# Patient Record
Sex: MALE | Race: WHITE | NOT HISPANIC OR LATINO | Employment: FULL TIME | URBAN - METROPOLITAN AREA
[De-identification: names, ages, dates, MRNs, and addresses within clinical notes are randomized per-mention and may not be internally consistent; named-entity substitution may affect disease eponyms.]

---

## 2017-03-03 DIAGNOSIS — E78.5 HYPERLIPIDEMIA WITH TARGET LDL LESS THAN 130: ICD-10-CM

## 2017-03-03 RX ORDER — ATORVASTATIN CALCIUM 40 MG/1
40 TABLET, FILM COATED ORAL DAILY
Qty: 90 TABLET | Refills: 0 | Status: SHIPPED | OUTPATIENT
Start: 2017-03-03 | End: 2017-05-09

## 2017-03-03 NOTE — TELEPHONE ENCOUNTER
"Patient last refill 11/17/2015. Not sure if you wanted to start patient again at 40mg.    Note from 10/10/2016.    \"Hyperlipidemia with target LDL less than 130  Has intermittent problems with statin. He is willing to try again. Discussed vitamin D can help with aches if low. discussed co Q 10. He has found he does ok for awhile with a statin and then switches. Could also start at low dose and build up.  Did review the CV risk calculation with him.  He may try the atorvastatin again\"    Please send prescription to pharmacy if approved.    Loan Hung RN    "

## 2017-03-03 NOTE — TELEPHONE ENCOUNTER
Sent a 90 day supply. May be a good idea to do fasting lipid profile.  And check ALT (liver).

## 2017-03-03 NOTE — TELEPHONE ENCOUNTER
Patient states had restarted Lipitor after last OV. Has been taking the 40 mg and doing fine. Prescription t'd up to send to pharmacy.    Loan Hung RN

## 2017-03-03 NOTE — TELEPHONE ENCOUNTER
Please contact him and see if he has been taking any...     If not, I suggest we start lower dose.   Otherwise, see what he is taking and how he is doing...      Thanks!

## 2017-03-03 NOTE — TELEPHONE ENCOUNTER
atorvastatin (LIPITOR) 40 MG     Last Written Prescription Date: 11/17/15  Last Fill Quantity: 90, # refills: 0  Last Office Visit with G, P or [facility] prescribing provider: 10/10/16       Lab Results   Component Value Date    CHOL 237 10/04/2016     Lab Results   Component Value Date    HDL 40 10/04/2016     Lab Results   Component Value Date     10/04/2016     Lab Results   Component Value Date    TRIG 207 10/04/2016     Lab Results   Component Value Date    CHOLHDLRATIO 3.8 09/04/2014

## 2017-05-09 DIAGNOSIS — E78.5 HYPERLIPIDEMIA WITH TARGET LDL LESS THAN 130: ICD-10-CM

## 2017-05-09 NOTE — TELEPHONE ENCOUNTER
atorvastatin (LIPITOR) 40 MG     Last Written Prescription Date: 3/3/17  Last Fill Quantity: 90, # refills: 0  Last Office Visit with G, P or [facility] prescribing provider: 10/10/16       Lab Results   Component Value Date    CHOL 237 10/04/2016     Lab Results   Component Value Date    HDL 40 10/04/2016     Lab Results   Component Value Date     10/04/2016     Lab Results   Component Value Date    TRIG 207 10/04/2016     Lab Results   Component Value Date    CHOLHDLRATIO 3.8 09/04/2014

## 2017-05-11 RX ORDER — ATORVASTATIN CALCIUM 40 MG/1
TABLET, FILM COATED ORAL
Qty: 90 TABLET | Refills: 0 | Status: SHIPPED | OUTPATIENT
Start: 2017-05-11 | End: 2017-05-25

## 2017-05-11 NOTE — TELEPHONE ENCOUNTER
Pt. Informed that he needs lab only appt. Lab only scheduled for next Thursday.     Medication is being filled for 1 time refill only due to:  Future labs ordered FLP and ALT..       #90 sent d/t insurance requirements.    Prescription approved per [facility].    Flori Helton RN, BSN, PHN

## 2017-05-25 DIAGNOSIS — E78.5 HYPERLIPIDEMIA WITH TARGET LDL LESS THAN 130: ICD-10-CM

## 2017-05-25 LAB
ALT SERPL W P-5'-P-CCNC: 25 U/L (ref 0–70)
CHOLEST SERPL-MCNC: 139 MG/DL
HDLC SERPL-MCNC: 40 MG/DL
LDLC SERPL CALC-MCNC: 81 MG/DL
NONHDLC SERPL-MCNC: 99 MG/DL
TRIGL SERPL-MCNC: 91 MG/DL

## 2017-05-25 PROCEDURE — 80061 LIPID PANEL: CPT | Performed by: FAMILY MEDICINE

## 2017-05-25 PROCEDURE — 36415 COLL VENOUS BLD VENIPUNCTURE: CPT | Performed by: FAMILY MEDICINE

## 2017-05-25 PROCEDURE — 84460 ALANINE AMINO (ALT) (SGPT): CPT | Performed by: FAMILY MEDICINE

## 2017-05-25 RX ORDER — ATORVASTATIN CALCIUM 40 MG/1
40 TABLET, FILM COATED ORAL DAILY
Qty: 90 TABLET | Refills: 1 | Status: SHIPPED | OUTPATIENT
Start: 2017-05-25 | End: 2018-03-14

## 2017-10-27 ENCOUNTER — TRANSFERRED RECORDS (OUTPATIENT)
Dept: HEALTH INFORMATION MANAGEMENT | Facility: CLINIC | Age: 52
End: 2017-10-27

## 2017-11-08 ENCOUNTER — HOSPITAL ENCOUNTER (OUTPATIENT)
Facility: CLINIC | Age: 52
Discharge: HOME | End: 2017-11-08
Attending: INTERNAL MEDICINE | Admitting: INTERNAL MEDICINE
Payer: COMMERCIAL

## 2017-11-08 VITALS
SYSTOLIC BLOOD PRESSURE: 125 MMHG | BODY MASS INDEX: 33.18 KG/M2 | DIASTOLIC BLOOD PRESSURE: 74 MMHG | HEIGHT: 72 IN | RESPIRATION RATE: 23 BRPM | OXYGEN SATURATION: 95 % | WEIGHT: 245 LBS

## 2017-11-08 LAB — COLONOSCOPY: NORMAL

## 2017-11-08 PROCEDURE — G0121 COLON CA SCRN NOT HI RSK IND: HCPCS | Performed by: INTERNAL MEDICINE

## 2017-11-08 PROCEDURE — G0500 MOD SEDAT ENDO SERVICE >5YRS: HCPCS | Performed by: INTERNAL MEDICINE

## 2017-11-08 PROCEDURE — 25000128 H RX IP 250 OP 636: Performed by: INTERNAL MEDICINE

## 2017-11-08 PROCEDURE — 45378 DIAGNOSTIC COLONOSCOPY: CPT | Performed by: INTERNAL MEDICINE

## 2017-11-08 RX ORDER — LIDOCAINE 40 MG/G
CREAM TOPICAL
Status: DISCONTINUED | OUTPATIENT
Start: 2017-11-08 | End: 2017-11-08 | Stop reason: HOSPADM

## 2017-11-08 RX ORDER — NALOXONE HYDROCHLORIDE 0.4 MG/ML
.1-.4 INJECTION, SOLUTION INTRAMUSCULAR; INTRAVENOUS; SUBCUTANEOUS
Status: DISCONTINUED | OUTPATIENT
Start: 2017-11-08 | End: 2017-11-08 | Stop reason: HOSPADM

## 2017-11-08 RX ORDER — ONDANSETRON 2 MG/ML
4 INJECTION INTRAMUSCULAR; INTRAVENOUS EVERY 6 HOURS PRN
Status: DISCONTINUED | OUTPATIENT
Start: 2017-11-08 | End: 2017-11-08 | Stop reason: HOSPADM

## 2017-11-08 RX ORDER — FENTANYL CITRATE 50 UG/ML
INJECTION, SOLUTION INTRAMUSCULAR; INTRAVENOUS PRN
Status: DISCONTINUED | OUTPATIENT
Start: 2017-11-08 | End: 2017-11-08 | Stop reason: HOSPADM

## 2017-11-08 RX ORDER — ONDANSETRON 4 MG/1
4 TABLET, ORALLY DISINTEGRATING ORAL EVERY 6 HOURS PRN
Status: DISCONTINUED | OUTPATIENT
Start: 2017-11-08 | End: 2017-11-08 | Stop reason: HOSPADM

## 2017-11-08 RX ORDER — FLUMAZENIL 0.1 MG/ML
0.2 INJECTION, SOLUTION INTRAVENOUS
Status: DISCONTINUED | OUTPATIENT
Start: 2017-11-08 | End: 2017-11-08 | Stop reason: HOSPADM

## 2017-11-08 RX ORDER — ONDANSETRON 2 MG/ML
4 INJECTION INTRAMUSCULAR; INTRAVENOUS
Status: DISCONTINUED | OUTPATIENT
Start: 2017-11-08 | End: 2017-11-08 | Stop reason: HOSPADM

## 2017-11-08 NOTE — LETTER
October 25, 2017      Nash Hauser  [id number]  [place] [address]        Dear Nash,       Thank you for choosing [facility]. You are scheduled for the following service.   Date:  November 8th, 2017 - Wednesday       Procedure: COLONOSCOPY  Doctor:   Oral Goodman         Arrival Time:  12:00 pm   *check in at Emergency/Endoscopy desk*  Procedure Time:  12:30 pm    Location:   [facility]        Endoscopy Department, First Floor (Enter through ER Doors) *         [address] [place], [place] [address]      [phone] or [phone] () to reschedule        Colonoscopy is the most accurate test to detect colon polyps and colon cancer; and the only test where polyps can be removed. During this procedure, a doctor examines the lining of your large intestine and rectum through a flexible tube.     Transportation  Arrange for a ride for the day of your procedures with a responsible adult.  A taxi ride is not an option unless you are accompanied by a responsible adult. If you fail to arrange transportation with a responsible adult, your procedure will be cancelled and rescheduled.        NuLYTELY  PREP    Fill your enclosed prescription for NuLYTELY  at your local pharmacy. Please call our office at [phone] if you did not receive a prescription.      PREPARATION FOR COLONOSCOPY    7 days before:    Discontinue fiber supplements and medications containing iron. This includes Metamucil  and Fibercon ; and multivitamins with iron.  3 days before:    Begin a low-fiber diet. A low-fiber diet helps making the cleanout more effective. For additional details on low-fiber diet, please refer to the table on the last page.  2 days before:    Continue the low-fiber diet.     Drink at least 8 glasses of water throughout the day.     Stop eating solid foods at 11:45 pm.  1 day before:    In the morning: begin a clear liquid diet (liquids you can see  through).     Examples of a clear liquid diet include: water, clear broth or bouillon, Gatorade, Pedialyte or Powerade, carbonated and non-carbonated soft drinks (Sprite , 7-Up , ginger ale), strained fruit juices without pulp (apple, white grape, white cranberry), Jell-O  and popsicles.     The following are not allowed on a clear liquid diet: red liquids, alcoholic beverages, coffee, dairy products (milk, creamer, and yogurt), protein shakes, creamy broths, juice with pulp and chewing tobacco.    At 4pm: drink 1 (one) 8 oz glass of NuLYTELY  solution every 15 minutes until half the bottle (approximately 8 glasses of 8 oz) is gone. Keep the solution refrigerated. Do not drink any other liquids while you are drinking the NuLYTELY  solution.      Over the course of the evening, drink an additional   liter of clear liquids and continue clear liquid diet.    COLON CLEANSING TIPS: drink adequate amounts of fluids before and after your colon cleansing to prevent dehydration. Stay near a toilet because you will have diarrhea. Even if you are sitting on the toilet, continue to drink the cleansing solution every 15 minutes. If you feel nauseous or vomit, rinse your mouth with water, take a 15 to 30-minute-break and then continue drinking the solution. You will be uncomfortable until the stool has flushed from your colon (in about 2 to 4 hours). You may feel chilled.    DAY OF YOUR PROCEDURE  You may take all of your morning medications including blood pressure medications, blood thinners (if you have not been instructed to stop these by our office), methadone, and anti-seizure medications with sips of water 3 hours prior to your procedure or earlier. Do not take insulin or vitamins prior to your procedure. Continue the clear liquid diet.    6 hours prior: drink 1 (one) 8 oz glass of NuLYTELY  solution every 15 minutes until the remaining solution (approximately 8 glasses of 8 oz) is gone. Keep the solution  refrigerated.      4 hours prior:   o STOP consuming all liquids   o Do not take anything by mouth during this time.   o Allow extra time to travel to your procedure as you may need to stop and use a restroom along the way.  You are ready for the procedure, if you followed all instructions and your stool is no longer formed, but clear or yellow liquid. If you are unsure whether your colon is clean, please call our office at [phone] before you leave for your appointment.      Bring the following to your procedure:  - Insurance Card/Photo ID.   - List of current medications including over-the-counter medications and supplements.   - Your rescue inhaler if you currently use one to control asthma.    Canceling or rescheduling your appointment:   If you must cancel or reschedule your appointment, please call [phone] as soon as possible.    COLONOSCOPY PRE-PROCEDURE CHECKLIST  If you have diabetes, ask your regular doctor for diet and medication restrictions.  If you take an anticoagulant or anti-platelet medication (such as Coumadin , Lovenox , Pradaxa , Xarelto , Eliquis , etc.), please call your primary doctor for advice on holding this medication.  If you take aspirin you may continue to do so.  If you are or may be pregnant, please discuss the risks and benefits of this procedure with your doctor.    What happens during a colonoscopy?    Plan to spend up to two hours, starting at registration time, at the endoscopy center the day of your procedure. The colonoscopy takes an average of 15 to 30 minutes. Recovery time is about 30 minutes.    Before the exam:    You will change into a gown.    Your medical history and medication list will be reviewed with you, unless that has been done over the phone prior to the procedure.     A nurse will insert an intravenous (IV) line into your hand or arm.    The doctor will meet with you and will give you a consent form to sign.    During the exam:     Medicine will be  given through the IV line to help you relax.     Your heart rate and oxygen levels will be monitored. If your blood pressure is low, you may be given fluids through the IV line.     The doctor will insert a flexible hollow tube, called a colonoscope, into your rectum. The scope will be advanced slowly through the large intestine (colon).    You may have a feeling of fullness or pressure.     If an abnormal tissue or a polyp is found, the doctor may remove it through the endoscope for closer examination, or biopsy. Tissue removal is painless.          After the exam:           Any tissue samples removed during the exam will be sent to a lab for evaluation. It may take 5-7 working days for you to be notified of the results.     A nurse will provide you with complete discharge instructions before you leave the endoscopy center. Be sure to ask the nurse for specific instructions if you take blood thinners such as Aspirin, Coumadin or Plavix.     The doctor will prepare a full report for you and for the physician who referred you for the procedure.     Your doctor will talk with you about the initial results of your exam.      Medication given during the exam will prohibit you from driving for the rest of the day.     Following the exam, you may resume your normal diet. Your first meal should be light, no greasy foods. Avoid alcohol until the next day.     You may resume your regular activities the day after the procedure.     LOW-FIBER DIET  Foods RECOMMENDED Foods to AVOID   Breads, Cereal, Rice and Pasta:   White bread, rolls, biscuits, croissant and celso toast.   Waffles, Lao toast and pancakes.   White rice, noodles, pasta, macaroni and peeled cooked potatoes.   Plain crackers and saltines.   Cooked cereals: farina, cream of rice.   Cold cereals: Puffed Rice , Rice Krispies , Corn Flakes  and Special K    Breads, Cereal, Rice and Pasta:   Breads or rolls with nuts, seeds or fruit.   Whole wheat, pumpernickel,  rye breads and cornbread.   Potatoes with skin, brown or wild rice, and kasha (buckwheat).     Vegetables:   Tender cooked and canned vegetables without seeds: carrots, asparagus tips, green or wax beans, pumpkin, spinach, lima beans. Vegetables:   Raw or steamed vegetables.   Vegetables with seeds.   Sauerkraut.   Winter squash, peas, broccoli, Brussel sprouts, cabbage, onions, cauliflower, baked beans, peas and corn.   Fruits:   Strained fruit juice.   Canned fruit, except pineapple.   Ripe bananas and melon. Fruits:   Prunes and prune juice.   Raw fruits.   Dried fruits: figs, dates and raisins.   Milk/Dairy:   Milk: plain or flavored.   Yogurt, custard and ice cream.   Cheese and cottage cheese Milk/Dairy:     Meat and other proteins:   ground, well-cooked tender beef, lamb, ham, veal, pork, fish, poultry and organ meats.   Eggs.   Peanut butter without nuts. Meat and other proteins:   Tough, fibrous meats with gristle.   Dry beans, peas and lentils.   Peanut butter with nuts.   Tofu.   Fats, Snack, Sweets, Condiments and Beverages:   Margarine, butter, oils, mayonnaise, sour cream and salad dressing, plain gravy.   Sugar, hard candy, clear jelly, honey and syrup.   Spices, cooked herbs, bouillon, broth and soups made with allowed vegetable, ketchup and mustard.   Coffee, tea and carbonated drinks.   Plain cakes, cookies and pretzels.   Gelatin, plain puddings, custard, ice cream, sherbet and popsicles. Fats, Snack, Sweets, Condiments and Beverages:   Nuts, seeds and coconut.   Jam, marmalade and preserves.   Pickles, olives, relish and horseradish.   All desserts containing nuts, seeds, dried fruit and coconut; or made from whole grains or bran.   Candy made with nuts or seeds.   Popcorn.   DIRECTIONS TO THE ENDOSCOPY DEPARTMENT    From the [place] ([facility])  Take [address] South, exit on [address] . Get into the left hand ho, turn left (east), go one-half mile to [address] and  turn left. Go north to the first stoplight, take a right on [address] and follow it to the Emergency entrance.    From the south ([facility])  Take [address]N to the [address] split and exit on [address] . On [address] , turn left (west) to [address]. Turn right (north) on [address]. Go north to the first stoplight, take a right on [address] and follow it to the Emergency entrance.    From the [place] via [address] ([facility])  Take [address] south to [place] [address] exit. Turn right on [address] . Go west to [address]. Turn right (north) on [address]. Go to the first stoplight, take a right and follow on [address] to the Emergency entrance.    From the east via [address] ([facility])  Take [address] to [address]. Turn left (south) on [address] to [address]. Turn left (east) on [address] and follow it to the Emergency entrance.    From the west via [address] ([facility])  Take [address] east to [address]. Turn right (south) on [address] to [address]. Turn left (east) on [address] and follow it to the Emergency entrance.

## 2017-11-08 NOTE — LETTER
October 25, 2017      Nash Hauser  [id number]  [place] [address]        Dear Nash,       Thank you for choosing [facility]. You are scheduled for the following service.   Date:  November 8th, 2017 - Wednesday       Procedure: COLONOSCOPY  Doctor:   Oral Goodman         Arrival Time:  12:00 pm   *check in at Emergency/Endoscopy desk*  Procedure Time:  12:30 pm    Location:   [facility]        Endoscopy Department, First Floor (Enter through ER Doors) *         [address] [place], [place] [address]      [phone] or [phone] () to reschedule        Colonoscopy is the most accurate test to detect colon polyps and colon cancer; and the only test where polyps can be removed. During this procedure, a doctor examines the lining of your large intestine and rectum through a flexible tube.     Transportation  Arrange for a ride for the day of your procedures with a responsible adult.  A taxi ride is not an option unless you are accompanied by a responsible adult. If you fail to arrange transportation with a responsible adult, your procedure will be cancelled and rescheduled.        NuLYTELY  PREP    Fill your enclosed prescription for NuLYTELY  at your local pharmacy. Please call our office at [phone] if you did not receive a prescription.      PREPARATION FOR COLONOSCOPY    7 days before:    Discontinue fiber supplements and medications containing iron. This includes Metamucil  and Fibercon ; and multivitamins with iron.  3 days before:    Begin a low-fiber diet. A low-fiber diet helps making the cleanout more effective. For additional details on low-fiber diet, please refer to the table on the last page.  2 days before:    Continue the low-fiber diet.     Drink at least 8 glasses of water throughout the day.     Stop eating solid foods at 11:45 pm.  1 day before:    In the morning: begin a clear liquid diet (liquids you can see  through).     Examples of a clear liquid diet include: water, clear broth or bouillon, Gatorade, Pedialyte or Powerade, carbonated and non-carbonated soft drinks (Sprite , 7-Up , ginger ale), strained fruit juices without pulp (apple, white grape, white cranberry), Jell-O  and popsicles.     The following are not allowed on a clear liquid diet: red liquids, alcoholic beverages, coffee, dairy products (milk, creamer, and yogurt), protein shakes, creamy broths, juice with pulp and chewing tobacco.    At 4pm: drink 1 (one) 8 oz glass of NuLYTELY  solution every 15 minutes until half the bottle (approximately 8 glasses of 8 oz) is gone. Keep the solution refrigerated. Do not drink any other liquids while you are drinking the NuLYTELY  solution.      Over the course of the evening, drink an additional   liter of clear liquids and continue clear liquid diet.    COLON CLEANSING TIPS: drink adequate amounts of fluids before and after your colon cleansing to prevent dehydration. Stay near a toilet because you will have diarrhea. Even if you are sitting on the toilet, continue to drink the cleansing solution every 15 minutes. If you feel nauseous or vomit, rinse your mouth with water, take a 15 to 30-minute-break and then continue drinking the solution. You will be uncomfortable until the stool has flushed from your colon (in about 2 to 4 hours). You may feel chilled.    DAY OF YOUR PROCEDURE  You may take all of your morning medications including blood pressure medications, blood thinners (if you have not been instructed to stop these by our office), methadone, and anti-seizure medications with sips of water 3 hours prior to your procedure or earlier. Do not take insulin or vitamins prior to your procedure. Continue the clear liquid diet.    6 hours prior: drink 1 (one) 8 oz glass of NuLYTELY  solution every 15 minutes until the remaining solution (approximately 8 glasses of 8 oz) is gone. Keep the solution  refrigerated.      4 hours prior:   o STOP consuming all liquids   o Do not take anything by mouth during this time.   o Allow extra time to travel to your procedure as you may need to stop and use a restroom along the way.  You are ready for the procedure, if you followed all instructions and your stool is no longer formed, but clear or yellow liquid. If you are unsure whether your colon is clean, please call our office at [phone] before you leave for your appointment.      Bring the following to your procedure:  - Insurance Card/Photo ID.   - List of current medications including over-the-counter medications and supplements.   - Your rescue inhaler if you currently use one to control asthma.    Canceling or rescheduling your appointment:   If you must cancel or reschedule your appointment, please call [phone] as soon as possible.    COLONOSCOPY PRE-PROCEDURE CHECKLIST  If you have diabetes, ask your regular doctor for diet and medication restrictions.  If you take an anticoagulant or anti-platelet medication (such as Coumadin , Lovenox , Pradaxa , Xarelto , Eliquis , etc.), please call your primary doctor for advice on holding this medication.  If you take aspirin you may continue to do so.  If you are or may be pregnant, please discuss the risks and benefits of this procedure with your doctor.    What happens during a colonoscopy?    Plan to spend up to two hours, starting at registration time, at the endoscopy center the day of your procedure. The colonoscopy takes an average of 15 to 30 minutes. Recovery time is about 30 minutes.    Before the exam:    You will change into a gown.    Your medical history and medication list will be reviewed with you, unless that has been done over the phone prior to the procedure.     A nurse will insert an intravenous (IV) line into your hand or arm.    The doctor will meet with you and will give you a consent form to sign.    During the exam:     Medicine will be  given through the IV line to help you relax.     Your heart rate and oxygen levels will be monitored. If your blood pressure is low, you may be given fluids through the IV line.     The doctor will insert a flexible hollow tube, called a colonoscope, into your rectum. The scope will be advanced slowly through the large intestine (colon).    You may have a feeling of fullness or pressure.     If an abnormal tissue or a polyp is found, the doctor may remove it through the endoscope for closer examination, or biopsy. Tissue removal is painless.          After the exam:           Any tissue samples removed during the exam will be sent to a lab for evaluation. It may take 5-7 working days for you to be notified of the results.     A nurse will provide you with complete discharge instructions before you leave the endoscopy center. Be sure to ask the nurse for specific instructions if you take blood thinners such as Aspirin, Coumadin or Plavix.     The doctor will prepare a full report for you and for the physician who referred you for the procedure.     Your doctor will talk with you about the initial results of your exam.      Medication given during the exam will prohibit you from driving for the rest of the day.     Following the exam, you may resume your normal diet. Your first meal should be light, no greasy foods. Avoid alcohol until the next day.     You may resume your regular activities the day after the procedure.     LOW-FIBER DIET  Foods RECOMMENDED Foods to AVOID   Breads, Cereal, Rice and Pasta:   White bread, rolls, biscuits, croissant and celso toast.   Waffles, Bengali toast and pancakes.   White rice, noodles, pasta, macaroni and peeled cooked potatoes.   Plain crackers and saltines.   Cooked cereals: farina, cream of rice.   Cold cereals: Puffed Rice , Rice Krispies , Corn Flakes  and Special K    Breads, Cereal, Rice and Pasta:   Breads or rolls with nuts, seeds or fruit.   Whole wheat, pumpernickel,  rye breads and cornbread.   Potatoes with skin, brown or wild rice, and kasha (buckwheat).     Vegetables:   Tender cooked and canned vegetables without seeds: carrots, asparagus tips, green or wax beans, pumpkin, spinach, lima beans. Vegetables:   Raw or steamed vegetables.   Vegetables with seeds.   Sauerkraut.   Winter squash, peas, broccoli, Brussel sprouts, cabbage, onions, cauliflower, baked beans, peas and corn.   Fruits:   Strained fruit juice.   Canned fruit, except pineapple.   Ripe bananas and melon. Fruits:   Prunes and prune juice.   Raw fruits.   Dried fruits: figs, dates and raisins.   Milk/Dairy:   Milk: plain or flavored.   Yogurt, custard and ice cream.   Cheese and cottage cheese Milk/Dairy:     Meat and other proteins:   ground, well-cooked tender beef, lamb, ham, veal, pork, fish, poultry and organ meats.   Eggs.   Peanut butter without nuts. Meat and other proteins:   Tough, fibrous meats with gristle.   Dry beans, peas and lentils.   Peanut butter with nuts.   Tofu.   Fats, Snack, Sweets, Condiments and Beverages:   Margarine, butter, oils, mayonnaise, sour cream and salad dressing, plain gravy.   Sugar, hard candy, clear jelly, honey and syrup.   Spices, cooked herbs, bouillon, broth and soups made with allowed vegetable, ketchup and mustard.   Coffee, tea and carbonated drinks.   Plain cakes, cookies and pretzels.   Gelatin, plain puddings, custard, ice cream, sherbet and popsicles. Fats, Snack, Sweets, Condiments and Beverages:   Nuts, seeds and coconut.   Jam, marmalade and preserves.   Pickles, olives, relish and horseradish.   All desserts containing nuts, seeds, dried fruit and coconut; or made from whole grains or bran.   Candy made with nuts or seeds.   Popcorn.   DIRECTIONS TO THE ENDOSCOPY DEPARTMENT    From the [place] ([facility])  Take [address] South, exit on [address] . Get into the left hand ho, turn left (east), go one-half mile to [address] and  turn left. Go north to the first stoplight, take a right on [address] and follow it to the Emergency entrance.    From the south ([facility])  Take [address]N to the [address]E split and exit on [address] . On [address] , turn left (west) to [address]. Turn right (north) on [address]. Go north to the first stoplight, take a right on [address] and follow it to the Emergency entrance.    From the east via [address] ([facility])  Take [address] south to [place] [address] exit. Turn right on [address] . Go west to [address]. Turn right (north) on [address]. Go to the first stoplight, take a right and follow on [address] to the Emergency entrance.    From the east via [address] ([facility])  Take [address] to [address]. Turn left (south) on [address] to [address]. Turn left (east) on [address] and follow it to the Emergency entrance.    From the west via [address] ([facility])  Take [address] east to [address]. Turn right (south) on [address] to [address]. Turn left (east) on [address] and follow it to the Emergency entrance.

## 2017-11-08 NOTE — H&P
Pre-Endoscopy History and Physical     Nash Hauser MRN# [id number]   YOB: 1965 Age: 52 year old     Date of Procedure: 11/8/2017  Primary care provider: Nguyen Murray  Type of Endoscopy: Colonoscopy with possible biopsy, possible polypectomy  Reason for Procedure: screen  Type of Anesthesia Anticipated: Conscious Sedation    HPI:    Nash is a 52 year old male who will be undergoing the above procedure.      A history and physical has been performed. The patient's medications and allergies have been reviewed. The risks and benefits of the procedure and the sedation options and risks were discussed with the patient.  All questions were answered and informed consent was obtained.      He denies a personal or family history of anesthesia complications or bleeding disorders.     Patient Active Problem List   Diagnosis     Unilateral small kidney     Other genital herpes     Impaired fasting glucose     Hyperlipidemia with target LDL less than 130     Hypertension goal BP (blood pressure) < 140/90     Elevated BMI (H)        Past Medical History:   Diagnosis Date     Hypertension      Other and unspecified hyperlipidemia      Other genital herpes     Uses oral Valtrex and topical acyclovir when gets flareup     Renal disease     Only one functioning kidney     Unilateral small kidney     Atrophic rt kidney        Past Surgical History:   Procedure Laterality Date     BACK SURGERY       C NONSPECIFIC PROCEDURE  2002    R Elbow surgery; incl resection of portion of radial head     C NONSPECIFIC PROCEDURE  2002    Lumbar microdiskectomy     C NONSPECIFIC PROCEDURE  1992    tonsillectomy     C NONSPECIFIC PROCEDURE  2004    Another diskectomy     elbow and back surgery       EXCISE EXOSTOSIS TOE(S)  9/23/2013    Procedure: EXCISE EXOSTOSIS TOE(S);  Left Third Toe Bone Spur removal ;  Surgeon: Stephani Ramirez DPM, Podiatr;  Location:  OR     SURGICAL HISTORY OF -       LASIK right eye; did not work as has  some kind of basement membrane disorder.       Social History   Substance Use Topics     Smoking status: Never Smoker     Smokeless tobacco: Never Used     Alcohol use 2.0 - 3.0 oz/week     4 - 6 Standard drinks or equivalent per week      Comment: 7-8 beers per week       Family History   Problem Relation Age of Onset     C.A.D. Father      Had coronary artery bypass; late 60's.     CANCER Father      skin     Eye Disorder Mother      cataracts; glaucoma, mac degeneration     Respiratory Mother      COPD     Colon Cancer No family hx of        Prior to Admission medications    Medication Sig Start Date End Date Taking? Authorizing Provider   atorvastatin (LIPITOR) 40 MG tablet Take 1 tablet (40 mg) by mouth daily 5/25/17  Yes Nguyen Murray MD   losartan-hydrochlorothiazide (HYZAAR) 100-25 MG per tablet Take 1 tablet by mouth daily   Yes Reported, Patient   Cetirizine HCl (ZYRTEC ALLERGY PO) Take  by mouth.   Yes Reported, Patient   order for DME Equipment being ordered: tall aircast boot 9/28/16   Stephani Ramirez DPM, Podiatry/Foot and Ankle Surgery   hydrocortisone (ANUSOL-HC) 2.5 % rectal cream Place rectally 3 times daily as needed for hemorrhoids 11/24/14   Nguyen Murray MD       Allergies   Allergen Reactions     No Known Drug Allergies         REVIEW OF SYSTEMS:   5 point ROS negative except as noted above in HPI, including Gen., Resp., CV, GI &  system review.    PHYSICAL EXAM:   /79  Resp 11  Ht 1.829 m (6')  Wt 111.1 kg (245 lb)  SpO2 97%  BMI 33.23 kg/m2 Estimated body mass index is 33.23 kg/(m^2) as calculated from the following:    Height as of this encounter: 1.829 m (6').    Weight as of this encounter: 111.1 kg (245 lb).   GENERAL APPEARANCE: alert, and oriented  MENTAL STATUS: alert  AIRWAY EXAM: Mallampatti Class I (visualization of the soft palate, fauces, uvula, anterior and posterior pillars)  RESP: lungs clear to auscultation - no rales, rhonchi or wheezes  CV: regular rates and  rhythm  DIAGNOSTICS:    Not indicated    IMPRESSION   ASA Class 2 - Mild systemic disease    PLAN:   Plan for Colonoscopy with possible biopsy, possible polypectomy. We discussed the risks, benefits and alternatives and the patient wished to proceed.    The above has been forwarded to the consulting provider.      Signed Electronically by: Oral Goodman  November 8, 2017

## 2018-03-14 DIAGNOSIS — E78.5 HYPERLIPIDEMIA WITH TARGET LDL LESS THAN 130: Primary | ICD-10-CM

## 2018-03-14 DIAGNOSIS — I10 HYPERTENSION GOAL BP (BLOOD PRESSURE) < 140/90: ICD-10-CM

## 2018-03-14 DIAGNOSIS — R73.01 IMPAIRED FASTING GLUCOSE: ICD-10-CM

## 2018-03-15 RX ORDER — ATORVASTATIN CALCIUM 40 MG/1
TABLET, FILM COATED ORAL
Qty: 90 TABLET | Refills: 0 | Status: SHIPPED | OUTPATIENT
Start: 2018-03-15 | End: 2018-07-29

## 2018-03-15 NOTE — TELEPHONE ENCOUNTER
He is due for visit.  Almost due for fasting lab.    Please call and help him schedule.  Did give 90 day refill at this time.        Recent Labs   Lab Test  05/25/17   0825  10/04/16   0916  09/04/14   0906  03/03/14   0850   CHOL  139  237*  169  246*   HDL  40  40  45  47   LDL  81  156*  88  157*   TRIG  91  207*  179*  210*   CHOLHDLRATIO   --    --   3.8  5.2*     ]

## 2018-03-15 NOTE — TELEPHONE ENCOUNTER
"Requested Prescriptions   Pending Prescriptions Disp Refills     atorvastatin (LIPITOR) 40 MG tablet [Pharmacy Med Name: ATORVASTATIN 40 MG TABLET]    Last Written Prescription Date:  5/25/2017  Last Fill Quantity: 90,  # refills: 1   Last office visit: 10/10/2016 with prescribing provider:  Nguyen Murray     Future Office Visit:     90 tablet 0     Sig: TAKE 1 TABLET BY MOUTH DAILY    Statins Protocol Failed    3/14/2018  7:24 PM       Failed - Recent (12 mo) or future (30 days) visit within the authorizing provider's specialty    Patient had office visit in the last 12 months or has a visit in the next 30 days with authorizing provider or within the authorizing provider's specialty.  See \"Patient Info\" tab in inbasket, or \"Choose Columns\" in Meds & Orders section of the refill encounter.           Passed - LDL on file in past 12 months    Recent Labs   Lab Test  05/25/17   0825   LDL  81            Passed - No abnormal creatine kinase in past 12 months    No lab results found.         Passed - Patient is age 18 or older          "

## 2018-03-15 NOTE — TELEPHONE ENCOUNTER
Routing refill request to provider for review/approval because:  Loli given x1 and patient did not follow up, please advise  Patient needs to be seen because it has been more than 1 year since last office visit.

## 2018-04-05 DIAGNOSIS — E78.5 HYPERLIPIDEMIA WITH TARGET LDL LESS THAN 130: ICD-10-CM

## 2018-04-05 DIAGNOSIS — I10 HYPERTENSION GOAL BP (BLOOD PRESSURE) < 140/90: ICD-10-CM

## 2018-04-05 DIAGNOSIS — R73.01 IMPAIRED FASTING GLUCOSE: ICD-10-CM

## 2018-04-05 LAB
ALBUMIN SERPL-MCNC: 4.2 G/DL (ref 3.4–5)
ALP SERPL-CCNC: 83 U/L (ref 40–150)
ALT SERPL W P-5'-P-CCNC: 39 U/L (ref 0–70)
ANION GAP SERPL CALCULATED.3IONS-SCNC: 7 MMOL/L (ref 3–14)
AST SERPL W P-5'-P-CCNC: 28 U/L (ref 0–45)
BILIRUB SERPL-MCNC: 0.6 MG/DL (ref 0.2–1.3)
BUN SERPL-MCNC: 23 MG/DL (ref 7–30)
CALCIUM SERPL-MCNC: 9.2 MG/DL (ref 8.5–10.1)
CHLORIDE SERPL-SCNC: 102 MMOL/L (ref 94–109)
CHOLEST SERPL-MCNC: 158 MG/DL
CO2 SERPL-SCNC: 27 MMOL/L (ref 20–32)
CREAT SERPL-MCNC: 1.36 MG/DL (ref 0.66–1.25)
CREAT UR-MCNC: 54 MG/DL
GFR SERPL CREATININE-BSD FRML MDRD: 55 ML/MIN/1.7M2
GLUCOSE SERPL-MCNC: 109 MG/DL (ref 70–99)
HBA1C MFR BLD: 5.6 % (ref 0–6.4)
HDLC SERPL-MCNC: 40 MG/DL
LDLC SERPL CALC-MCNC: 97 MG/DL
MICROALBUMIN UR-MCNC: 5 MG/L
MICROALBUMIN/CREAT UR: 9.39 MG/G CR (ref 0–17)
NONHDLC SERPL-MCNC: 118 MG/DL
POTASSIUM SERPL-SCNC: 4.2 MMOL/L (ref 3.4–5.3)
PROT SERPL-MCNC: 7.7 G/DL (ref 6.8–8.8)
SODIUM SERPL-SCNC: 136 MMOL/L (ref 133–144)
TRIGL SERPL-MCNC: 106 MG/DL

## 2018-04-05 PROCEDURE — 36415 COLL VENOUS BLD VENIPUNCTURE: CPT | Performed by: FAMILY MEDICINE

## 2018-04-05 PROCEDURE — 83036 HEMOGLOBIN GLYCOSYLATED A1C: CPT | Performed by: FAMILY MEDICINE

## 2018-04-05 PROCEDURE — 80061 LIPID PANEL: CPT | Performed by: FAMILY MEDICINE

## 2018-04-05 PROCEDURE — 82043 UR ALBUMIN QUANTITATIVE: CPT | Performed by: FAMILY MEDICINE

## 2018-04-05 PROCEDURE — 80053 COMPREHEN METABOLIC PANEL: CPT | Performed by: FAMILY MEDICINE

## 2018-04-23 ENCOUNTER — OFFICE VISIT (OUTPATIENT)
Dept: FAMILY MEDICINE | Facility: CLINIC | Age: 53
End: 2018-04-23
Payer: COMMERCIAL

## 2018-04-23 VITALS
RESPIRATION RATE: 16 BRPM | SYSTOLIC BLOOD PRESSURE: 126 MMHG | WEIGHT: 256.3 LBS | DIASTOLIC BLOOD PRESSURE: 86 MMHG | BODY MASS INDEX: 34.72 KG/M2 | HEART RATE: 65 BPM | TEMPERATURE: 98.3 F | OXYGEN SATURATION: 99 % | HEIGHT: 72 IN

## 2018-04-23 DIAGNOSIS — I10 HYPERTENSION GOAL BP (BLOOD PRESSURE) < 140/90: ICD-10-CM

## 2018-04-23 DIAGNOSIS — E66.01 MORBID OBESITY DUE TO EXCESS CALORIES (H): ICD-10-CM

## 2018-04-23 DIAGNOSIS — Z23 NEED FOR TDAP VACCINATION: ICD-10-CM

## 2018-04-23 DIAGNOSIS — R73.01 IMPAIRED FASTING GLUCOSE: ICD-10-CM

## 2018-04-23 DIAGNOSIS — N27.0 UNILATERAL SMALL KIDNEY: ICD-10-CM

## 2018-04-23 DIAGNOSIS — Z12.5 SCREENING FOR PROSTATE CANCER: ICD-10-CM

## 2018-04-23 DIAGNOSIS — Z00.00 ROUTINE GENERAL MEDICAL EXAMINATION AT A HEALTH CARE FACILITY: Primary | ICD-10-CM

## 2018-04-23 PROCEDURE — 90715 TDAP VACCINE 7 YRS/> IM: CPT | Performed by: FAMILY MEDICINE

## 2018-04-23 PROCEDURE — 90471 IMMUNIZATION ADMIN: CPT | Performed by: FAMILY MEDICINE

## 2018-04-23 PROCEDURE — 99396 PREV VISIT EST AGE 40-64: CPT | Mod: 25 | Performed by: FAMILY MEDICINE

## 2018-04-23 PROCEDURE — G0103 PSA SCREENING: HCPCS | Performed by: FAMILY MEDICINE

## 2018-04-23 ASSESSMENT — ENCOUNTER SYMPTOMS
ABDOMINAL PAIN: 0
NERVOUS/ANXIOUS: 0
DIARRHEA: 0
FREQUENCY: 0
PARESTHESIAS: 0
CONSTIPATION: 0
SHORTNESS OF BREATH: 0
PALPITATIONS: 0
JOINT SWELLING: 0
ARTHRALGIAS: 0
EYE PAIN: 0
HEARTBURN: 0
MYALGIAS: 0
WEAKNESS: 0
DYSURIA: 0
HEMATURIA: 0
COUGH: 0
NAUSEA: 0
DIZZINESS: 0
HEADACHES: 0
CHILLS: 0
HEMATOCHEZIA: 0
FEVER: 0
SORE THROAT: 0

## 2018-04-23 NOTE — PROGRESS NOTES
SUBJECTIVE:   CC: Nash Hauser is an 53 year old male who presents for preventative health visit.     Patient is NOT fasting, had labs done last week    Concerns:  1. Hemorrhoids have popped up a couple days ago, has been using cream which has been helping    Physical   Annual:     Getting at least 3 servings of Calcium per day::  NO    Bi-annual eye exam::  Yes    Dental care twice a year::  Yes    Sleep apnea or symptoms of sleep apnea::  Excessive snoring    Diet::  Low salt and Carbohydrate counting    Frequency of exercise::  4-5 days/week    Duration of exercise::  30-45 minutes    Taking medications regularly::  Yes    Medication side effects::  None    Additional concerns today::  YES              Today's PHQ-2 Score:   PHQ-2 ( 1999 Pfizer) 4/23/2018   Q1: Little interest or pleasure in doing things 0   Q2: Feeling down, depressed or hopeless 0   PHQ-2 Score 0   Q1: Little interest or pleasure in doing things Not at all   Q2: Feeling down, depressed or hopeless Not at all   PHQ-2 Score 0     Abuse: Current or Past(Physical, Sexual or Emotional)- No  Do you feel safe in your environment - Yes    Social History   Substance Use Topics     Smoking status: Never Smoker     Smokeless tobacco: Never Used     Alcohol use 2.0 - 3.0 oz/week     4 - 6 Standard drinks or equivalent per week      Comment: 7-8 beers per week     Alcohol Use 4/23/2018   If you drink alcohol do you typically have greater than 3 drinks per day OR greater than 7 drinks per week? No       Last PSA:   PSA   Date Value Ref Range Status   10/10/2016 0.52 0 - 4 ug/L Final     Comment:     Assay Method:  Chemiluminescence using Siemens Vista analyzer       Reviewed orders with patient. Reviewed health maintenance and updated orders accordingly - Yes      Reviewed and updated as needed this visit by clinical staff  Tobacco  Allergies  Meds  Med Hx  Surg Hx  Fam Hx  Soc Hx        Reviewed and updated as needed this visit by Provider         Patient Active Problem List   Diagnosis     Unilateral small kidney     Other genital herpes     Impaired fasting glucose     Hyperlipidemia with target LDL less than 130     Hypertension goal BP (blood pressure) < 140/90     Elevated BMI (H)       Past Medical History:   Diagnosis Date     Hypertension      Other and unspecified hyperlipidemia      Other genital herpes     Uses oral Valtrex and topical acyclovir when gets flareup     Renal disease     Only one functioning kidney     Unilateral small kidney     Atrophic rt kidney       Past Surgical History:   Procedure Laterality Date     BACK SURGERY  2002    Lumbar microdiskectomy     C NONSPECIFIC PROCEDURE  1992    tonsillectomy     C NONSPECIFIC PROCEDURE  2004    Another diskectomy     COLONOSCOPY N/A 11/8/2017    repeat 10 years.Procedure: COLONOSCOPY;  COLONOSCOPY;  Surgeon: Oral Goodman MD;  Location:  GI     ELBOW SURGERY  2002    R Elbow surgery; incl resection of portion of radial head     EXCISE EXOSTOSIS TOE(S)  9/23/2013    Procedure: EXCISE EXOSTOSIS TOE(S);  Left Third Toe Bone Spur removal ;  Surgeon: Stephani Ramirez DPM, Podiatr;  Location:  OR     SURGICAL HISTORY OF -       LASIK right eye; did not work as has some kind of basement membrane disorder.       Current Outpatient Prescriptions   Medication Sig Dispense Refill     atorvastatin (LIPITOR) 40 MG tablet TAKE 1 TABLET BY MOUTH DAILY 90 tablet 0     Cetirizine HCl (ZYRTEC ALLERGY PO) Take  by mouth.       hydrocortisone (ANUSOL-HC) 2.5 % rectal cream Place rectally 3 times daily as needed for hemorrhoids 30 g 9     losartan-hydrochlorothiazide (HYZAAR) 100-25 MG per tablet Take 1 tablet by mouth daily       order for DME Equipment being ordered: tall aircast boot 1 Device 0       Family History   Problem Relation Age of Onset     C.A.D. Father      Had coronary artery bypass; late 60's.     CANCER Father      skin     Fractures Father      femur; just below hip     Eye Disorder  "Mother      cataracts; glaucoma, mac degeneration     Respiratory Mother      COPD     Colon Cancer No family hx of        Social History   Substance Use Topics     Smoking status: Never Smoker     Smokeless tobacco: Never Used     Alcohol use 2.0 - 3.0 oz/week     4 - 6 Standard drinks or equivalent per week      Comment: 7-8 beers per week       Immunization History   Administered Date(s) Administered     HepB 09/12/2001, 10/19/2001     Influenza (IIV3) PF 10/03/2012     Influenza Vaccine IM 3yrs+ 4 Valent IIV4 10/10/2016     TD (ADULT, 7+) 10/15/1997     TDAP Vaccine (Adacel) 06/23/2008, 04/23/2018         Review of Systems   Constitutional: Negative for chills and fever.   HENT: Negative for congestion, ear pain, hearing loss and sore throat.    Eyes: Negative for pain and visual disturbance.   Respiratory: Negative for cough and shortness of breath.    Cardiovascular: Negative for chest pain, palpitations and peripheral edema.   Gastrointestinal: Negative for abdominal pain, constipation, diarrhea, heartburn, hematochezia and nausea.   Genitourinary: Negative for discharge, dysuria, frequency, genital sores, hematuria, impotence and urgency.   Musculoskeletal: Negative for arthralgias, joint swelling and myalgias.   Skin: Negative for rash.   Neurological: Negative for dizziness, weakness, headaches and paresthesias.   Psychiatric/Behavioral: Negative for mood changes. The patient is not nervous/anxious.      He notes bp outside of here: Low 120's/mid 80's.     OBJECTIVE:   /86 (BP Location: Right arm, Patient Position: Chair, Cuff Size: Adult Regular)  Pulse 65  Temp 98.3  F (36.8  C) (Oral)  Resp 16  Ht 5' 11.5\" (1.816 m)  Wt 256 lb 4.8 oz (116.3 kg)  SpO2 99%  BMI 35.25 kg/m2    Physical Exam  GENERAL: alert and no distress  EYES: Eyes grossly normal to inspection, PERRL and conjunctivae and sclerae normal  HENT: ear canals and TM's normal, nose and mouth without ulcers or lesions  NECK: no " adenopathy, no asymmetry, masses, or scars and thyroid normal to palpation  RESP: lungs clear to auscultation - no rales, rhonchi or wheezes  CV: regular rates and rhythm, peripheral pulses strong and no peripheral edema  ABDOMEN: soft, nontender, no hepatosplenomegaly, no masses and bowel sounds normal  MS: no gross musculoskeletal defects noted, no edema  SKIN: no suspicious lesions or rashes  NEURO: Normal strength and tone, mentation intact and speech normal  PSYCH: mentation appears normal, affect normal/bright    The 10-year ASCVD risk score (London THEO Jr, et al., 2013) is: 5%    Values used to calculate the score:      Age: 53 years      Sex: Male      Is Non- : No      Diabetic: No      Tobacco smoker: No      Systolic Blood Pressure: 136 mmHg      Is BP treated: Yes      HDL Cholesterol: 40 mg/dL      Total Cholesterol: 158 mg/dL    Lab Results   Component Value Date    A1C 5.6 04/05/2018    A1C 5.4 10/04/2016    A1C 5.9 09/04/2014    A1C 5.8 03/03/2014    A1C 5.7 06/07/2013     Recent Labs   Lab Test  04/05/18   0837  05/25/17   0825   09/04/14   0906  03/03/14   0850   CHOL  158  139   < >  169  246*   HDL  40  40   < >  45  47   LDL  97  81   < >  88  157*   TRIG  106  91   < >  179*  210*   CHOLHDLRATIO   --    --    --   3.8  5.2*    < > = values in this interval not displayed.         ASSESSMENT/PLAN:     Routine general medical examination at a health care facility      Elevated BMI (H)  Encourage active lifestyle. Healthy nutrition.    Hypertension goal BP (blood pressure) < 140/90  Borderline control. He reports better control with home monitoring.   He does see Nephrology.     Unilateral small kidney  He does see Nephrology.     Impaired fasting glucose  HgbA1C satisfactory. Continue to follow. Encourage healthy lifestyle.     Screening for prostate cancer  Discussed.   - PSA, screen    Need for Tdap vaccination    - TDAP VACCINE (ADACEL)  - ADMIN 1st VACCINE  - SCREENING  "QUESTIONS FOR ADULT IMMUNIZATIONS        COUNSELING:   Reviewed preventive health counseling, as reflected in patient instructions       Regular exercise       Healthy diet/nutrition       Prostate cancer screening         reports that he has never smoked. He has never used smokeless tobacco.    Estimated body mass index is 35.25 kg/(m^2) as calculated from the following:    Height as of this encounter: 5' 11.5\" (1.816 m).    Weight as of this encounter: 256 lb 4.8 oz (116.3 kg).   Weight management plan: Discussed healthy diet and exercise guidelines and patient will follow up in 12 months in clinic to re-evaluate.    Counseling Resources:  ATP IV Guidelines  Pooled Cohorts Equation Calculator  FRAX Risk Assessment  ICSI Preventive Guidelines  Dietary Guidelines for Americans, 2010  USDA's MyPlate  ASA Prophylaxis  Lung CA Screening    Nguyen Murray MD, MD  [facility] [place]  Answers for HPI/ROS submitted by the patient on 4/23/2018   PHQ-2 Score: 0    "

## 2018-04-23 NOTE — MR AVS SNAPSHOT
After Visit Summary   4/23/2018    Nash Hauser    MRN: [id number]           Patient Information     Date Of Birth          1965        Visit Information        Provider Department      4/23/2018 8:50 AM Nguyen Murray MD [facility]        Today's Diagnoses     Routine general medical examination at a health care facility    -  1    Screening for prostate cancer        Need for Tdap vaccination          Care Instructions      Preventive Health Recommendations  Male Ages 50 - 64    Yearly exam:             See your health care provider every year in order to  o   Review health changes.   o   Discuss preventive care.    o   Review your medicines if your doctor has prescribed any.     Have a cholesterol test every 5 years, or more frequently if you are at risk for high cholesterol/heart disease.     Have a diabetes test (fasting glucose) every three years. If you are at risk for diabetes, you should have this test more often.     Have a colonoscopy at age 50, or have a yearly FIT test (stool test). These exams will check for colon cancer.      Talk with your health care provider about whether or not a prostate cancer screening test (PSA) is right for you.    You should be tested each year for STDs (sexually transmitted diseases), if you re at risk.     Shots: Get a flu shot each year. Get a tetanus shot every 10 years.     Nutrition:    Eat at least 5 servings of fruits and vegetables daily.     Eat whole-grain bread, whole-wheat pasta and brown rice instead of white grains and rice.     Talk to your provider about Calcium and Vitamin D.     Lifestyle    Exercise for at least 150 minutes a week (30 minutes a day, 5 days a week). This will help you control your weight and prevent disease.     Limit alcohol to one drink per day.     No smoking.     Wear sunscreen to prevent skin cancer.     See your dentist every six months for an exam and cleaning.     See your eye doctor every 1 to 2  "years.            Follow-ups after your visit        Who to contact     If you have questions or need follow up information about today's clinic visit or your schedule please contact [facility] directly at [phone].  Normal or non-critical lab and imaging results will be communicated to you by MyChart, letter or phone within 4 business days after the clinic has received the results. If you do not hear from us within 7 days, please contact the clinic through MyChart or phone. If you have a critical or abnormal lab result, we will notify you by phone as soon as possible.  Submit refill requests through [employer] or call your pharmacy and they will forward the refill request to us. Please allow 3 business days for your refill to be completed.          Additional Information About Your Visit        [employer] Information     [employer] gives you secure access to your electronic health record. If you see a primary care provider, you can also send messages to your care team and make appointments. If you have questions, please call your primary care clinic.  If you do not have a primary care provider, please call [phone] and they will assist you.        Care EveryWhere ID     This is your Care EveryWhere ID. This could be used by other organizations to access your [place] medical records  [id number]        Your Vitals Were     Pulse Temperature Respirations Height Pulse Oximetry BMI (Body Mass Index)    65 98.3  F (36.8  C) (Oral) 16 5' 11.5\" (1.816 m) 99% 35.25 kg/m2       Blood Pressure from Last 3 Encounters:   04/23/18 136/90   11/08/17 125/74   10/10/16 124/80    Weight from Last 3 Encounters:   04/23/18 256 lb 4.8 oz (116.3 kg)   11/08/17 245 lb (111.1 kg)   10/10/16 255 lb 11.2 oz (116 kg)              We Performed the Following     PSA, screen     TDAP VACCINE (ADACEL)        Primary Care Provider Office Phone # Fax #    Nguyen Murray [phone]       [id number] [address] [facility] [address]        Equal Access to Services     [facility] : Hadii isac schulz haddiane Somyleneali, waaxda luqadaha, qaybta kadbkareem pinedakaydenkareem, emma lumajorjag womack. So [facility] [phone].    ATENCIÓN: Si habla español, tiene a zavala disposición servicios gratuitos de asistencia lingüística. [facility] [phone].    We comply with applicable federal civil rights laws and [place] laws. We do not discriminate on the basis of race, color, national origin, age, disability, sex, sexual orientation, or gender identity.            Thank you!     Thank you for choosing [facility]  for your care. Our goal is always to provide you with excellent care. Hearing back from our patients is one way we can continue to improve our services. Please take a few minutes to complete the written survey that you may receive in the mail after your visit with us. Thank you!             Your Updated Medication List - Protect others around you: Learn how to safely use, store and throw away your medicines at www.disposemymeds.org.          This list is accurate as of 4/23/18  9:30 AM.  Always use your most recent med list.                   Brand Name Dispense Instructions for use Diagnosis    atorvastatin 40 MG tablet    LIPITOR    90 tablet    TAKE 1 TABLET BY MOUTH DAILY    Hyperlipidemia with target LDL less than 130       hydrocortisone 2.5 % cream    ANUSOL-HC    30 g    Place rectally 3 times daily as needed for hemorrhoids    External bleeding hemorrhoids       losartan-hydrochlorothiazide 100-25 MG per tablet    HYZAAR     Take 1 tablet by mouth daily    Left foot pain, Abnormally increased muscle contraction       order for DME     1 Device    Equipment being ordered: tall aircast boot    Left foot pain, Abnormally increased muscle contraction       ZYRTEC ALLERGY PO      Take  by mouth.

## 2018-04-24 LAB — PSA SERPL-ACNC: 0.55 UG/L (ref 0–4)

## 2018-07-29 DIAGNOSIS — E78.5 HYPERLIPIDEMIA WITH TARGET LDL LESS THAN 130: ICD-10-CM

## 2018-07-29 NOTE — TELEPHONE ENCOUNTER
"Requested Prescriptions   Pending Prescriptions Disp Refills     atorvastatin (LIPITOR) 40 MG tablet [Pharmacy Med Name: ATORVASTATIN 40 MG TABLET]  Last Written Prescription Date:  03/15/2018  Last Fill Quantity: 90 tablet,  # refills: 0   Last office visit: 4/23/2018 with prescribing provider:      Nguyen Murray MD        Future Office Visit:     90 tablet 0     Sig: TAKE 1 TABLET BY MOUTH DAILY    Statins Protocol Passed    7/29/2018  2:48 PM       Passed - LDL on file in past 12 months    Recent Labs   Lab Test  04/05/18   0837   LDL  97            Passed - No abnormal creatine kinase in past 12 months    No lab results found.            Passed - Recent (12 mo) or future (30 days) visit within the authorizing provider's specialty    Patient had office visit in the last 12 months or has a visit in the next 30 days with authorizing provider or within the authorizing provider's specialty.  See \"Patient Info\" tab in inbasket, or \"Choose Columns\" in Meds & Orders section of the refill encounter.           Passed - Patient is age 18 or older          "

## 2018-08-01 RX ORDER — ATORVASTATIN CALCIUM 40 MG/1
TABLET, FILM COATED ORAL
Qty: 90 TABLET | Refills: 1 | Status: SHIPPED | OUTPATIENT
Start: 2018-08-01 | End: 2022-08-25

## 2018-08-01 NOTE — TELEPHONE ENCOUNTER
Prescription approved per [facility], [facility] or [facility] refill protocol.  Tanesha Mendoza RN - Triage  [facility]

## 2018-10-29 ENCOUNTER — TRANSFERRED RECORDS (OUTPATIENT)
Dept: HEALTH INFORMATION MANAGEMENT | Facility: CLINIC | Age: 53
End: 2018-10-29

## 2019-01-17 ENCOUNTER — TELEPHONE (OUTPATIENT)
Dept: FAMILY MEDICINE | Facility: CLINIC | Age: 54
End: 2019-01-17

## 2019-01-17 DIAGNOSIS — R73.01 IMPAIRED FASTING GLUCOSE: ICD-10-CM

## 2019-01-17 DIAGNOSIS — E78.5 HYPERLIPIDEMIA WITH TARGET LDL LESS THAN 130: ICD-10-CM

## 2019-01-17 DIAGNOSIS — Z12.5 SCREENING FOR PROSTATE CANCER: ICD-10-CM

## 2019-01-17 DIAGNOSIS — I10 HYPERTENSION GOAL BP (BLOOD PRESSURE) < 140/90: ICD-10-CM

## 2019-01-17 DIAGNOSIS — N27.0 UNILATERAL SMALL KIDNEY: Primary | ICD-10-CM

## 2019-01-17 NOTE — TELEPHONE ENCOUNTER
Reason for call:  Order   Order or referral being requested: Labs for PX on 4/25.  Wants to do labs on 4/18.  Appointment is scheduled  Reason for request: Physical  Date needed: 4/18/2019  Has the patient been seen by the PCP for this problem? Not Applicable    Additional comments:     Phone number to reach patient:  Home number on file [phone] (home)    Best Time:  any    Can we leave a detailed message on this number?  YES

## 2019-02-19 ENCOUNTER — ANCILLARY PROCEDURE (OUTPATIENT)
Dept: GENERAL RADIOLOGY | Facility: CLINIC | Age: 54
End: 2019-02-19
Attending: FAMILY MEDICINE
Payer: COMMERCIAL

## 2019-02-19 ENCOUNTER — OFFICE VISIT (OUTPATIENT)
Dept: FAMILY MEDICINE | Facility: CLINIC | Age: 54
End: 2019-02-19
Payer: COMMERCIAL

## 2019-02-19 VITALS
WEIGHT: 255.1 LBS | SYSTOLIC BLOOD PRESSURE: 142 MMHG | RESPIRATION RATE: 16 BRPM | HEART RATE: 73 BPM | DIASTOLIC BLOOD PRESSURE: 100 MMHG | BODY MASS INDEX: 34.55 KG/M2 | HEIGHT: 72 IN | TEMPERATURE: 99.3 F | OXYGEN SATURATION: 95 %

## 2019-02-19 DIAGNOSIS — R06.2 WHEEZE: ICD-10-CM

## 2019-02-19 DIAGNOSIS — J30.2 SEASONAL ALLERGIES: ICD-10-CM

## 2019-02-19 DIAGNOSIS — R05.9 COUGH: Primary | ICD-10-CM

## 2019-02-19 DIAGNOSIS — I10 HYPERTENSION GOAL BP (BLOOD PRESSURE) < 140/90: ICD-10-CM

## 2019-02-19 DIAGNOSIS — R05.9 COUGH: ICD-10-CM

## 2019-02-19 DIAGNOSIS — E66.01 MORBID OBESITY DUE TO EXCESS CALORIES (H): ICD-10-CM

## 2019-02-19 DIAGNOSIS — N27.0 UNILATERAL SMALL KIDNEY: ICD-10-CM

## 2019-02-19 LAB
FEF 25/75: 1.9
FEF 25/75: 2.8
FEV-1: 2.7
FEV-1: 3.11
FEV1/FVC: NORMAL
FEV1/FVC: NORMAL
FVC: 3.86
FVC: 4.11

## 2019-02-19 PROCEDURE — 99215 OFFICE O/P EST HI 40 MIN: CPT | Mod: 25 | Performed by: FAMILY MEDICINE

## 2019-02-19 PROCEDURE — 94060 EVALUATION OF WHEEZING: CPT | Performed by: FAMILY MEDICINE

## 2019-02-19 PROCEDURE — 71046 X-RAY EXAM CHEST 2 VIEWS: CPT | Mod: FY

## 2019-02-19 PROCEDURE — 94640 AIRWAY INHALATION TREATMENT: CPT | Mod: 59 | Performed by: FAMILY MEDICINE

## 2019-02-19 RX ORDER — ALBUTEROL SULFATE 90 UG/1
2 AEROSOL, METERED RESPIRATORY (INHALATION) EVERY 4 HOURS PRN
Qty: 1 INHALER | Refills: 0 | Status: SHIPPED | OUTPATIENT
Start: 2019-02-19 | End: 2019-04-30

## 2019-02-19 RX ORDER — ALBUTEROL SULFATE 90 UG/1
2 AEROSOL, METERED RESPIRATORY (INHALATION) ONCE
Status: DISCONTINUED | OUTPATIENT
Start: 2019-02-19 | End: 2019-02-19

## 2019-02-19 RX ORDER — PREDNISONE 10 MG/1
TABLET ORAL
Qty: 65 TABLET | Refills: 0 | Status: SHIPPED | OUTPATIENT
Start: 2019-02-19 | End: 2019-04-30

## 2019-02-19 RX ORDER — ALBUTEROL SULFATE 0.83 MG/ML
2.5 SOLUTION RESPIRATORY (INHALATION) ONCE
Status: DISCONTINUED | OUTPATIENT
Start: 2019-02-19 | End: 2021-07-20

## 2019-02-19 ASSESSMENT — MIFFLIN-ST. JEOR: SCORE: 2037.19

## 2019-02-19 NOTE — Clinical Note
I did a spirometry; wanted to do one before and after neb.After we had done the first one, it looks like there was a way I could have ordered this as pre and post neb; I put it in that way.Just want to be sure he does not get charged for too many nebs... (we just did the pre and post neb)... Thanks!Franklyn; let me know if I should be sending this to someone else... Thanks!

## 2019-02-19 NOTE — PROGRESS NOTES
spir  SUBJECTIVE:   Nash Hauser is a 53 year old male who presents to clinic today for the following health issues:      RESPIRATORY SYMPTOMS      Duration:ongoing for 4 months     Description  cough and wheezing    Severity: moderate to severe     Accompanying signs and symptoms: SOB -easier to inhale than exhaling     History (predisposing factors):  none    Precipitating or alleviating factors:     Therapies tried and outcome:  Robitussin- very little effectiveness           Problem list and histories reviewed & adjusted, as indicated.  Additional history:         Patient Active Problem List   Diagnosis     Unilateral small kidney     Other genital herpes     Impaired fasting glucose     Hyperlipidemia with target LDL less than 130     Hypertension goal BP (blood pressure) < 140/90     Elevated BMI (H)       Current Outpatient Medications   Medication Sig Dispense Refill     atorvastatin (LIPITOR) 40 MG tablet TAKE 1 TABLET BY MOUTH DAILY 90 tablet 1     hydrocortisone (ANUSOL-HC) 2.5 % rectal cream Place rectally 3 times daily as needed for hemorrhoids 30 g 9     losartan-hydrochlorothiazide (HYZAAR) 100-25 MG per tablet Take 1 tablet by mouth daily       Cetirizine HCl (ZYRTEC ALLERGY PO) Take  by mouth.           Reviewed and updated as needed this visit by clinical staff       Reviewed and updated as needed this visit by Provider       Social History     Tobacco Use     Smoking status: Never Smoker     Smokeless tobacco: Never Used   Substance Use Topics     Alcohol use: Yes     Alcohol/week: 2.0 - 3.0 oz     Types: 4 - 6 Standard drinks or equivalent per week     Comment: 7-8 beers per week       ROS:  CONSTITUTIONAL:NEGATIVE for fever, chills, change in weight  ENT/MOUTH: see below  RESP:see below  CV: NEGATIVE for chest pain, palpitations or peripheral edema  GI: NEGATIVE for nausea, abdominal pain, heartburn, or change in bowel habits; but did have a GI infection; see below.  MUSCULOSKELETAL: denies  "swelling.  PSYCHIATRIC: NEGATIVE for changes in mood or affect    In with cough, wheeze, shortness of breath since October.   Did have norovirus a couple weeks ago.  Over the weekend did have a head cold. Overall, feels better from this.    Since Halloween has had cough and wheeze.  Worse if goes out in the cold.     A few years ago, had something like this for a couple months.   Over winter. In chest. Did go away on its own.   This time, has remained.  Cannot catch breath. Sleeping is tough.  No one else ill.     This weekend was rough with the combination of the uri and underlying cough/wheeze.   Coughing fit; believed passed out.     Currently feels a bit dehydrated; has been out of town.  Notes when bends over, his nose will drip.    Coughs up frothy, white clear stuff.  When started, recalls cough. Just started.   Does not recall being sick.    Mother with history of COPD.      OBJECTIVE:     BP (!) 142/100 (BP Location: Right arm, Cuff Size: Adult Large)   Pulse 73   Temp 99.3  F (37.4  C) (Oral)   Resp 16   Ht 1.816 m (5' 11.5\")   Wt 115.7 kg (255 lb 1.6 oz)   SpO2 93%   BMI 35.08 kg/m    Body mass index is 35.08 kg/m .         GENERAL APPEARANCE: alert and no distress. Audible wheeze and some cough from across the room.   HENT: ear canals and TM's normal and nose and mouth without ulcers or lesions  NECK: no adenopathy  RESP: lungs with diffuse inspiratory and expiratory wheezes throughout both lung fields.  CV: regular rates and rhythm  MS: no edema   SKIN: no suspicious lesions or rashes  PSYCH: mentation appears normal and affect normal/bright    Pre - neb:        Post - neb.          After the neb, he felt better.   O2 sat was 95%.    CXR unremarkable.     ASSESSMENT/PLAN:     1. Cough  Consistent with bronchospasm. Uncertain underlying etiology; I wonder about asthma.  Possible initial trigger viral infection. Could be allergy.  Doubt PE; low probability.  Length of symptoms. Improved with beta " agonist.  At this time, will have him use albuterol inhaler.   Discussed process that tends to occur with asthma or similar respiratory illness.  Will go ahead with prednisone also. Discussed potential side effects.  Referring to Asthma specialist at this time.   Discussed going to ER for worsening.  - XR Chest 2 Views; Future  - Spirometry, Breathing Capacity: Normal Order, Clinic Performed  - albuterol (PROVENTIL) neb solution 2.5 mg; Take 3 mLs (2.5 mg) by nebulization once  - BRONCHODILATION RESPONSE, PRE/POST ADMIN  - INHALATION/NEBULIZER TREATMENT, INITIAL  - albuterol (PROAIR HFA/PROVENTIL HFA/VENTOLIN HFA) 108 (90 Base) MCG/ACT inhaler; Inhale 2 puffs into the lungs every 4 hours as needed for shortness of breath / dyspnea or wheezing  Dispense: 1 Inhaler; Refill: 0    2. Wheeze  As above.   - XR Chest 2 Views; Future  - Spirometry, Breathing Capacity: Normal Order, Clinic Performed  - albuterol (PROVENTIL) neb solution 2.5 mg; Take 3 mLs (2.5 mg) by nebulization once  - BRONCHODILATION RESPONSE, PRE/POST ADMIN  - predniSONE (DELTASONE) 10 MG tablet; 6 po daily x 3 days, then 5 po daily x 3 days, then 4 po daily x 3 days, then 3 po daily x 3 days, then 2 po daily x 3 days, then 1 po daily x 3 days, then 1/2 po daily x 4 days.  Dispense: 65 tablet; Refill: 0  - ALLERGY/ASTHMA ADULT REFERRAL  - INHALATION/NEBULIZER TREATMENT, INITIAL  - albuterol (PROAIR HFA/PROVENTIL HFA/VENTOLIN HFA) 108 (90 Base) MCG/ACT inhaler; Inhale 2 puffs into the lungs every 4 hours as needed for shortness of breath / dyspnea or wheezing  Dispense: 1 Inhaler; Refill: 0    3. Hypertension goal BP (blood pressure) < 140/90  Not at goal. May be due to his illness. Discussed rechecking when feeling better.   He notes he watches at home. Does have a PE appointment soon.   Does see Renal annually.    4. Elevated BMI (H)  To work on lifestyle.     5. Seasonal allergies  He notes worsening as an adult.   Has had some wheezing related to  allergies in the past.   - ALLERGY/ASTHMA ADULT REFERRAL    6. Unilateral small kidney  Sees Renal annually.     I spent ~40 minutes face to face, of which at least 50 % was spent in counseling or coordination of care for wheezing..    Nguyen Murray MD, MD  [facility]    Reviewed probability regarding PE from UP TO DATE.   He does not have any points. We discussed and decided not to do CT. If he has ongoing symptoms, he should go to ER.

## 2019-02-20 NOTE — NURSING NOTE
The following nebulizer treatment was given:     MEDICATION: Albuterol Sulfate 1.25 mg  : [employer]  LOT #: [id number]  EXPIRATION DATE:  01/2020  NDC # 5266-1150-10       See Vital Signs Flowsheet

## 2019-04-25 DIAGNOSIS — N27.0 UNILATERAL SMALL KIDNEY: ICD-10-CM

## 2019-04-25 DIAGNOSIS — I10 HYPERTENSION GOAL BP (BLOOD PRESSURE) < 140/90: ICD-10-CM

## 2019-04-25 DIAGNOSIS — Z12.5 SCREENING FOR PROSTATE CANCER: ICD-10-CM

## 2019-04-25 DIAGNOSIS — E78.5 HYPERLIPIDEMIA WITH TARGET LDL LESS THAN 130: ICD-10-CM

## 2019-04-25 DIAGNOSIS — R73.01 IMPAIRED FASTING GLUCOSE: ICD-10-CM

## 2019-04-25 LAB
ALBUMIN SERPL-MCNC: 4.2 G/DL (ref 3.4–5)
ALP SERPL-CCNC: 87 U/L (ref 40–150)
ALT SERPL W P-5'-P-CCNC: 24 U/L (ref 0–70)
ANION GAP SERPL CALCULATED.3IONS-SCNC: 6 MMOL/L (ref 3–14)
AST SERPL W P-5'-P-CCNC: 27 U/L (ref 0–45)
BILIRUB SERPL-MCNC: 0.7 MG/DL (ref 0.2–1.3)
BUN SERPL-MCNC: 24 MG/DL (ref 7–30)
CALCIUM SERPL-MCNC: 9.6 MG/DL (ref 8.5–10.1)
CHLORIDE SERPL-SCNC: 101 MMOL/L (ref 94–109)
CHOLEST SERPL-MCNC: 153 MG/DL
CO2 SERPL-SCNC: 28 MMOL/L (ref 20–32)
CREAT SERPL-MCNC: 1.48 MG/DL (ref 0.66–1.25)
CREAT UR-MCNC: 140 MG/DL
GFR SERPL CREATININE-BSD FRML MDRD: 53 ML/MIN/{1.73_M2}
GLUCOSE SERPL-MCNC: 111 MG/DL (ref 70–99)
HBA1C MFR BLD: 5.6 % (ref 0–5.6)
HDLC SERPL-MCNC: 37 MG/DL
LDLC SERPL CALC-MCNC: 92 MG/DL
MICROALBUMIN UR-MCNC: 14 MG/L
MICROALBUMIN/CREAT UR: 10.36 MG/G CR (ref 0–17)
NONHDLC SERPL-MCNC: 116 MG/DL
POTASSIUM SERPL-SCNC: 4.3 MMOL/L (ref 3.4–5.3)
PROT SERPL-MCNC: 7.7 G/DL (ref 6.8–8.8)
PSA SERPL-ACNC: 0.72 UG/L (ref 0–4)
SODIUM SERPL-SCNC: 135 MMOL/L (ref 133–144)
TRIGL SERPL-MCNC: 121 MG/DL

## 2019-04-25 PROCEDURE — 36415 COLL VENOUS BLD VENIPUNCTURE: CPT | Performed by: FAMILY MEDICINE

## 2019-04-25 PROCEDURE — 80053 COMPREHEN METABOLIC PANEL: CPT | Performed by: FAMILY MEDICINE

## 2019-04-25 PROCEDURE — G0103 PSA SCREENING: HCPCS | Performed by: FAMILY MEDICINE

## 2019-04-25 PROCEDURE — 82043 UR ALBUMIN QUANTITATIVE: CPT | Performed by: FAMILY MEDICINE

## 2019-04-25 PROCEDURE — 83036 HEMOGLOBIN GLYCOSYLATED A1C: CPT | Performed by: FAMILY MEDICINE

## 2019-04-25 PROCEDURE — 80061 LIPID PANEL: CPT | Performed by: FAMILY MEDICINE

## 2019-04-30 ENCOUNTER — TELEPHONE (OUTPATIENT)
Dept: FAMILY MEDICINE | Facility: CLINIC | Age: 54
End: 2019-04-30

## 2019-04-30 ENCOUNTER — OFFICE VISIT (OUTPATIENT)
Dept: FAMILY MEDICINE | Facility: CLINIC | Age: 54
End: 2019-04-30
Payer: COMMERCIAL

## 2019-04-30 ENCOUNTER — DOCUMENTATION ONLY (OUTPATIENT)
Dept: FAMILY MEDICINE | Facility: CLINIC | Age: 54
End: 2019-04-30

## 2019-04-30 VITALS
WEIGHT: 256.1 LBS | DIASTOLIC BLOOD PRESSURE: 88 MMHG | BODY MASS INDEX: 34.69 KG/M2 | SYSTOLIC BLOOD PRESSURE: 138 MMHG | HEIGHT: 72 IN | OXYGEN SATURATION: 96 % | HEART RATE: 78 BPM | TEMPERATURE: 99.2 F

## 2019-04-30 DIAGNOSIS — E78.5 HYPERLIPIDEMIA WITH TARGET LDL LESS THAN 130: ICD-10-CM

## 2019-04-30 DIAGNOSIS — I10 HYPERTENSION GOAL BP (BLOOD PRESSURE) < 140/90: ICD-10-CM

## 2019-04-30 DIAGNOSIS — R05.9 COUGH: ICD-10-CM

## 2019-04-30 DIAGNOSIS — N27.0 UNILATERAL SMALL KIDNEY: ICD-10-CM

## 2019-04-30 DIAGNOSIS — R06.2 WHEEZE: ICD-10-CM

## 2019-04-30 DIAGNOSIS — R73.01 IMPAIRED FASTING GLUCOSE: ICD-10-CM

## 2019-04-30 DIAGNOSIS — Z00.00 ENCOUNTER FOR ROUTINE ADULT HEALTH EXAMINATION WITHOUT ABNORMAL FINDINGS: Primary | ICD-10-CM

## 2019-04-30 DIAGNOSIS — E66.01 MORBID OBESITY DUE TO EXCESS CALORIES (H): ICD-10-CM

## 2019-04-30 PROCEDURE — 99213 OFFICE O/P EST LOW 20 MIN: CPT | Mod: 25 | Performed by: FAMILY MEDICINE

## 2019-04-30 PROCEDURE — 99396 PREV VISIT EST AGE 40-64: CPT | Performed by: FAMILY MEDICINE

## 2019-04-30 RX ORDER — ALBUTEROL SULFATE 90 UG/1
2 AEROSOL, METERED RESPIRATORY (INHALATION) EVERY 4 HOURS PRN
Qty: 18 G | Refills: 0 | Status: SHIPPED | OUTPATIENT
Start: 2019-04-30 | End: 2019-08-07

## 2019-04-30 RX ORDER — ALBUTEROL SULFATE 90 UG/1
2 AEROSOL, METERED RESPIRATORY (INHALATION) EVERY 4 HOURS PRN
Qty: 180 G | Refills: 0 | Status: SHIPPED | OUTPATIENT
Start: 2019-04-30 | End: 2019-04-30

## 2019-04-30 RX ORDER — LOSARTAN POTASSIUM AND HYDROCHLOROTHIAZIDE 25; 100 MG/1; MG/1
1 TABLET ORAL DAILY
Status: CANCELLED | OUTPATIENT
Start: 2019-04-30

## 2019-04-30 ASSESSMENT — ENCOUNTER SYMPTOMS: COUGH: 1

## 2019-04-30 ASSESSMENT — MIFFLIN-ST. JEOR: SCORE: 2039.17

## 2019-04-30 NOTE — TELEPHONE ENCOUNTER
Corrected to dispense 18 g rather than 180.  Sent change to pharmacy.    Tnaesha SEGURA, Triage RN

## 2019-04-30 NOTE — PROGRESS NOTES
received records from [facility]  04/30/19 , put in Dr Terri hoyt for review and scanning.

## 2019-04-30 NOTE — PATIENT INSTRUCTIONS
I would recommend that you come in to our pharmacy or make a nurse only bp check. You check your bp with your cuff and have us check with ours. That way we can see how true yours is reading.  No appointment needed at the pharmacy.

## 2019-04-30 NOTE — TELEPHONE ENCOUNTER
Pharmacy notes: 180 grams = 21 inhalers. Please correct and resend.     Disp Refills Start End DANIEL   albuterol (PROAIR HFA/PROVENTIL HFA/VENTOLIN HFA) 108 (90 Base) MCG/ACT inhaler 180 g 0 4/30/2019  No   Sig - Route: Inhale 2 puffs into the lungs every 4 hours as needed for shortness of breath / dyspnea or wheezing - Inhalation   Sent to pharmacy as: albuterol (PROAIR HFA/PROVENTIL HFA/VENTOLIN HFA) 108 (90 Base) MCG/ACT inhaler

## 2019-04-30 NOTE — PROGRESS NOTES
SUBJECTIVE:   CC: Nash Hauser is an 54 year old male who presents for preventative health visit.     Healthy Habits:     Getting at least 3 servings of Calcium per day:  NO    Bi-annual eye exam:  Yes    Dental care twice a year:  Yes    Sleep apnea or symptoms of sleep apnea:  Excessive snoring    Diet:  Low salt and Carbohydrate counting    Frequency of exercise:  2-3 days/week    Duration of exercise:  15-30 minutes    Taking medications regularly:  Yes    Medication side effects:  None    PHQ-2 Total Score: 0    Additional concerns today:  No        Today's PHQ-2 Score:   PHQ-2 ( 1999 Pfizer) 4/30/2019   Q1: Little interest or pleasure in doing things 0   Q2: Feeling down, depressed or hopeless 0   PHQ-2 Score 0   Q1: Little interest or pleasure in doing things Not at all   Q2: Feeling down, depressed or hopeless Not at all   PHQ-2 Score 0       Abuse: Current or Past(Physical, Sexual or Emotional)- No  Do you feel safe in your environment? Yes    Social History     Tobacco Use     Smoking status: Never Smoker     Smokeless tobacco: Never Used   Substance Use Topics     Alcohol use: Yes     Alcohol/week: 2.0 - 3.0 oz     Types: 4 - 6 Standard drinks or equivalent per week     Comment: 7-8 beers per week         Alcohol Use 4/30/2019   Prescreen: >3 drinks/day or >7 drinks/week? Yes   Prescreen: >3 drinks/day or >7 drinks/week? -   AUDIT SCORE  4       Last PSA:   PSA   Date Value Ref Range Status   04/25/2019 0.72 0 - 4 ug/L Final     Comment:     Assay Method:  Chemiluminescence using Siemens Vista analyzer       Reviewed orders with patient. Reviewed health maintenance and updated orders accordingly - Yes      Reviewed and updated as needed this visit by clinical staff         Reviewed and updated as needed this visit by Provider        Patient Active Problem List   Diagnosis     Unilateral small kidney     Other genital herpes     Impaired fasting glucose     Hyperlipidemia with target LDL less than 130      Hypertension goal BP (blood pressure) < 140/90     Elevated BMI (H)       Past Medical History:   Diagnosis Date     Hypertension      Other and unspecified hyperlipidemia      Other genital herpes     Uses oral Valtrex and topical acyclovir when gets flareup     Renal disease     Only one functioning kidney     Unilateral small kidney     Atrophic rt kidney       Past Surgical History:   Procedure Laterality Date     BACK SURGERY  2002    Lumbar microdiskectomy     C NONSPECIFIC PROCEDURE  1992    tonsillectomy     C NONSPECIFIC PROCEDURE  2004    Another diskectomy     COLONOSCOPY N/A 11/8/2017    repeat 10 years.Procedure: COLONOSCOPY;  COLONOSCOPY;  Surgeon: Oral Goodman MD;  Location:  GI     ELBOW SURGERY  2002    R Elbow surgery; incl resection of portion of radial head     EXCISE EXOSTOSIS TOE(S)  9/23/2013    Procedure: EXCISE EXOSTOSIS TOE(S);  Left Third Toe Bone Spur removal ;  Surgeon: Stephani Ramirez DPM, Podiatr;  Location:  OR     SURGICAL HISTORY OF -       LASIK right eye; did not work as has some kind of basement membrane disorder.       Current Outpatient Medications   Medication Sig Dispense Refill     albuterol (PROAIR HFA/PROVENTIL HFA/VENTOLIN HFA) 108 (90 Base) MCG/ACT inhaler Inhale 2 puffs into the lungs every 4 hours as needed for shortness of breath / dyspnea or wheezing 1 Inhaler 0     atorvastatin (LIPITOR) 40 MG tablet TAKE 1 TABLET BY MOUTH DAILY 90 tablet 1     Cetirizine HCl (ZYRTEC ALLERGY PO) Take  by mouth.       hydrocortisone (ANUSOL-HC) 2.5 % rectal cream Place rectally 3 times daily as needed for hemorrhoids 30 g 9     losartan-hydrochlorothiazide (HYZAAR) 100-25 MG per tablet Take 1 tablet by mouth daily       predniSONE (DELTASONE) 10 MG tablet 6 po daily x 3 days, then 5 po daily x 3 days, then 4 po daily x 3 days, then 3 po daily x 3 days, then 2 po daily x 3 days, then 1 po daily x 3 days, then 1/2 po daily x 4 days. (Patient not taking: Reported on  "4/30/2019.) 65 tablet 0       Family History   Problem Relation Age of Onset     C.A.D. Father         Had coronary artery bypass; late 60's.     Cancer Father         skin     Fractures Father         femur; just below hip     Alzheimer Disease Father      Eye Disorder Mother         cataracts; glaucoma, mac degeneration     Respiratory Mother         COPD     Colon Cancer No family hx of        Social History     Tobacco Use     Smoking status: Never Smoker     Smokeless tobacco: Never Used   Substance Use Topics     Alcohol use: Yes     Alcohol/week: 2.0 - 3.0 oz     Types: 4 - 6 Standard drinks or equivalent per week     Comment: 7-8 beers per week       Immunization History   Administered Date(s) Administered     HepA-Adult 09/12/2001     HepB-Adult 09/12/2001, 10/19/2001, 03/19/2002, 11/15/2005, 12/22/2005     Influenza (IIV3) PF 10/03/2012     Influenza Vaccine IM 3yrs+ 4 Valent IIV4 11/22/2010, 10/06/2015, 10/10/2016     TD (ADULT, 7+) 10/15/1997     TDAP Vaccine (Adacel) 06/23/2008, 04/23/2018     TDAP Vaccine (Boostrix) 11/22/2010         Review of Systems   Respiratory: Positive for cough.          He notes his bp usually in the 120's/80's.    Has had some intermittent wheezing.  More wheezing. Bad bout on Saturday.     Lost the weight he gained from prednisone.   Diet and more activity     OBJECTIVE:   /90 (BP Location: Right arm, Patient Position: Sitting, Cuff Size: Adult Regular)   Pulse 78   Temp 99.2  F (37.3  C) (Oral)   Ht 1.82 m (5' 11.65\")   Wt 116.2 kg (256 lb 1.6 oz)   SpO2 96%   BMI 35.07 kg/m      /88  Upon repeat    Physical Exam  GENERAL: alert and no distress  EYES: Eyes grossly normal to inspection, PERRL and conjunctivae and sclerae normal  HENT: ear canals and TM's normal, nose and mouth without ulcers or lesions  NECK: no adenopathy, no asymmetry, masses, or scars and thyroid normal to palpation  RESP: lungs clear to auscultation - no rales, rhonchi or wheezes  CV: " regular rates and rhythm and peripheral pulses strong  ABDOMEN: soft, nontender, no hepatosplenomegaly, no masses and bowel sounds normal  MS: no gross musculoskeletal defects noted, no edema  SKIN: no suspicious lesions or rashes  NEURO: Normal strength and tone, mentation intact and speech normal  PSYCH: mentation appears normal, affect normal/bright    Component      Latest Ref Rng & Units 4/25/2019   Sodium      133 - 144 mmol/L 135   Potassium      3.4 - 5.3 mmol/L 4.3   Chloride      94 - 109 mmol/L 101   Carbon Dioxide      20 - 32 mmol/L 28   Anion Gap      3 - 14 mmol/L 6   Glucose      70 - 99 mg/dL 111 (H)   Urea Nitrogen      7 - 30 mg/dL 24   Creatinine      0.66 - 1.25 mg/dL 1.48 (H)   GFR Estimate      >60 mL/min/1.73:m2 53 (L)   GFR Estimate If Black      >60 mL/min/1.73:m2 62   Calcium      8.5 - 10.1 mg/dL 9.6   Bilirubin Total      0.2 - 1.3 mg/dL 0.7   Albumin      3.4 - 5.0 g/dL 4.2   Protein Total      6.8 - 8.8 g/dL 7.7   Alkaline Phosphatase      40 - 150 U/L 87   ALT      0 - 70 U/L 24   AST      0 - 45 U/L 27   Cholesterol      <200 mg/dL 153   Triglycerides      <150 mg/dL 121   HDL Cholesterol      >39 mg/dL 37 (L)   LDL Cholesterol Calculated      <100 mg/dL 92   Non HDL Cholesterol      <130 mg/dL 116   Creatinine Urine      mg/dL 140   Albumin Urine mg/L      mg/L 14   Albumin Urine mg/g Cr      0 - 17 mg/g Cr 10.36   PSA      0 - 4 ug/L 0.72   Hemoglobin A1C      0 - 5.6 % 5.6           ASSESSMENT/PLAN:   1. Encounter for routine adult health examination without abnormal findings      2. Cough  At this time, will treat with steroid inhaler.  Discussed to rinse and spit.  He will call to set up appointment with Asthma specialist.  - mometasone furoate (ASMANEX HFA) 200 MCG/ACT inhaler; Inhale 2 puffs into the lungs 2 times daily  Dispense: 1 Inhaler; Refill: 1    3. Wheeze  As above.   - mometasone furoate (ASMANEX HFA) 200 MCG/ACT inhaler; Inhale 2 puffs into the lungs 2 times daily   "Dispense: 1 Inhaler; Refill: 1    4. Elevated BMI (H)  Encourage ongoing lifestyle factors; he has lost some of what he had gained when on steroids.       5. Hypertension goal BP (blood pressure) < 140/90  Not at optimal control.   He notes he usually sees Renal in the Fall. Will request records.   Suggest he does get some additional readings at the pharmacy or nurse only appointments.     6. Unilateral small kidney  Sees Renal.     7. Hyperlipidemia with target LDL less than 130  Continuing statin.    8. Impaired fasting glucose  Encourage lifestyle.       COUNSELING:   Reviewed preventive health counseling, as reflected in patient instructions       Regular exercise       Healthy diet/nutrition       Vision screening    BP Readings from Last 1 Encounters:   02/19/19 (!) 142/100     Estimated body mass index is 35.08 kg/m  as calculated from the following:    Height as of 2/19/19: 1.816 m (5' 11.5\").    Weight as of 2/19/19: 115.7 kg (255 lb 1.6 oz).      Weight management plan: Discussed healthy diet and exercise guidelines     reports that he has never smoked. He has never used smokeless tobacco.      Counseling Resources:  ATP IV Guidelines  Pooled Cohorts Equation Calculator  FRAX Risk Assessment  ICSI Preventive Guidelines  Dietary Guidelines for Americans, 2010  USDA's MyPlate  ASA Prophylaxis  Lung CA Screening    Nguyen Murray MD, MD  [facility]    Addendum:  Requested more recent records from [facility]; received visit note from 12/16.    "

## 2019-08-07 DIAGNOSIS — R05.9 COUGH: ICD-10-CM

## 2019-08-07 DIAGNOSIS — R06.2 WHEEZE: ICD-10-CM

## 2019-08-08 NOTE — TELEPHONE ENCOUNTER
"Requested Prescriptions   Pending Prescriptions Disp Refills     VENTOLIN  (90 Base) MCG/ACT inhaler [Pharmacy Med Name: VENTOLIN HFA 90 MCG INHALER]  Last Written Prescription Date:  4/30/19  Last Fill Quantity: 18g,  # refills: 0   Last office visit: 4/30/2019 with prescribing provider:  Nguyen Murray MD   Future Office Visit:     18 Inhaler 0     Sig: INHALE 2 PUFFS INTO THE LUNGS EVERY 4 HOURS AS NEEDED FOR SHORTNESS OF BREATH / DYSPNEA OR WHEEZING       Asthma Maintenance Inhalers - Anticholinergics Passed - 8/7/2019  9:13 PM        Passed - Patient is age 12 years or older        Passed - Recent (12 mo) or future (30 days) visit within the authorizing provider's specialty     Patient had office visit in the last 12 months or has a visit in the next 30 days with authorizing provider or within the authorizing provider's specialty.  See \"Patient Info\" tab in inbasket, or \"Choose Columns\" in Meds & Orders section of the refill encounter.              Passed - Medication is active on med list          "

## 2019-08-09 RX ORDER — ALBUTEROL SULFATE 90 UG/1
AEROSOL, METERED RESPIRATORY (INHALATION)
Qty: 18 INHALER | Refills: 0 | Status: SHIPPED | OUTPATIENT
Start: 2019-08-09 | End: 2019-09-09

## 2019-09-09 DIAGNOSIS — R05.9 COUGH: ICD-10-CM

## 2019-09-09 DIAGNOSIS — R06.2 WHEEZE: ICD-10-CM

## 2019-09-09 NOTE — TELEPHONE ENCOUNTER
"Requested Prescriptions   Pending Prescriptions Disp Refills     VENTOLIN  (90 Base) MCG/ACT inhaler [Pharmacy Med Name: VENTOLIN HFA 90 MCG INHALER] 18 Inhaler 0     Sig: INHALE 2 PUFFS INTO THE LUNGS EVERY 4 HOURS AS NEEDED FOR SHORTNESS OF BREATH / DYSPNEA OR WHEEZING   Last Written Prescription Date:  8/9/19  Last Fill Quantity: 18,  # refills: 0   Last office visit: 4/30/2019 with prescribing provider:  Nguyen Murray MD   Future Office Visit:        Asthma Maintenance Inhalers - Anticholinergics Passed - 9/9/2019  1:39 PM        Passed - Patient is age 12 years or older        Passed - Recent (12 mo) or future (30 days) visit within the authorizing provider's specialty     Patient had office visit in the last 12 months or has a visit in the next 30 days with authorizing provider or within the authorizing provider's specialty.  See \"Patient Info\" tab in inbasket, or \"Choose Columns\" in Meds & Orders section of the refill encounter.              Passed - Medication is active on med list          "

## 2019-09-10 RX ORDER — ALBUTEROL SULFATE 90 UG/1
AEROSOL, METERED RESPIRATORY (INHALATION)
Qty: 18 INHALER | Refills: 3 | Status: SHIPPED | OUTPATIENT
Start: 2019-09-10

## 2019-09-10 NOTE — TELEPHONE ENCOUNTER
Prescription approved per [facility] Refill Protocol.    Instructions    Return in about 1 year (around 4/30/2020).       Yee Sagastume RN Flex

## 2019-11-08 ENCOUNTER — HEALTH MAINTENANCE LETTER (OUTPATIENT)
Age: 54
End: 2019-11-08

## 2019-11-12 ENCOUNTER — TRANSFERRED RECORDS (OUTPATIENT)
Dept: HEALTH INFORMATION MANAGEMENT | Facility: CLINIC | Age: 54
End: 2019-11-12

## 2019-12-11 ENCOUNTER — ANCILLARY PROCEDURE (OUTPATIENT)
Dept: GENERAL RADIOLOGY | Facility: CLINIC | Age: 54
End: 2019-12-11
Attending: PODIATRIST
Payer: COMMERCIAL

## 2019-12-11 ENCOUNTER — OFFICE VISIT (OUTPATIENT)
Dept: PODIATRY | Facility: CLINIC | Age: 54
End: 2019-12-11
Payer: COMMERCIAL

## 2019-12-11 VITALS
HEIGHT: 72 IN | BODY MASS INDEX: 34.67 KG/M2 | WEIGHT: 256 LBS | SYSTOLIC BLOOD PRESSURE: 130 MMHG | DIASTOLIC BLOOD PRESSURE: 78 MMHG

## 2019-12-11 DIAGNOSIS — M79.672 LEFT FOOT PAIN: ICD-10-CM

## 2019-12-11 DIAGNOSIS — M19.272 OTHER SECONDARY OSTEOARTHRITIS OF LEFT FOOT: ICD-10-CM

## 2019-12-11 DIAGNOSIS — M79.672 LEFT FOOT PAIN: Primary | ICD-10-CM

## 2019-12-11 PROCEDURE — 73610 X-RAY EXAM OF ANKLE: CPT | Mod: LT

## 2019-12-11 PROCEDURE — 99203 OFFICE O/P NEW LOW 30 MIN: CPT | Performed by: PODIATRIST

## 2019-12-11 PROCEDURE — 73630 X-RAY EXAM OF FOOT: CPT | Mod: LT

## 2019-12-11 ASSESSMENT — MIFFLIN-ST. JEOR: SCORE: 2038.65

## 2019-12-11 NOTE — LETTER
12/11/2019         RE: Nash Hauser  [address]  [place] [facility] [facility] [address]        Dear Colleague,    Thank you for referring your patient, Nash Hauser, to the [facility]. Please see a copy of my visit note below.    PATIENT HISTORY:   Nash Hauser is a 54 year old male who presents to clinic for left foot pain and swelling.  Notes that he set a heavy piece of furniture on it about 2 weeks ago. The next day it became really swollen and painful and he could not walk on it. Put himself in a boot that he had and has been able to walk in that but pain is still 9/10. Can't walk without the boot. Here with wife. Also notes that he gets intermittent pain and swelling to right foot and leg for years. Will start with cramping in the leg at night and then foot will hurt for a few weeks. Right foot not painful or swollen today. Wondering what is causing the pain to left foot and what is going on with the right foot.     Review of Systems:  Patient denies fever, chills, rash, wound,  numbness, weakness, heart burn, blood in stool, chest pain with activity, calf pain when walking, shortness of breath with activity, chronic cough, easy bleeding/bruising, excessive thirst, fatigue, depression, anxiety.  Patient admits to limping, swelling, stiffness.     PAST MEDICAL HISTORY:   Past Medical History:   Diagnosis Date     Hypertension      Other and unspecified hyperlipidemia      Other genital herpes     Uses oral Valtrex and topical acyclovir when gets flareup     Renal disease     Only one functioning kidney     Unilateral small kidney     Atrophic rt kidney        PAST SURGICAL HISTORY:   Past Surgical History:   Procedure Laterality Date     BACK SURGERY  2002    Lumbar microdiskectomy     C NONSPECIFIC PROCEDURE  1992    tonsillectomy     C NONSPECIFIC PROCEDURE  2004    Another diskectomy     COLONOSCOPY N/A 11/8/2017    repeat 10 years.Procedure: COLONOSCOPY;  COLONOSCOPY;  Surgeon: Oral Goodman  MD JONNA;  Location: [facility]     ELBOW SURGERY  2002    R Elbow surgery; incl resection of portion of radial head     EXCISE EXOSTOSIS TOE(S)  9/23/2013    Procedure: EXCISE EXOSTOSIS TOE(S);  Left Third Toe Bone Spur removal ;  Surgeon: Stephani Ramirez DPM, Podiatr;  Location:  OR     SURGICAL HISTORY OF -       LASIK right eye; did not work as has some kind of basement membrane disorder.        MEDICATIONS:   Current Outpatient Medications:      ASMANEX  MCG/ACT inhaler, TAKE 2 PUFFS BY MOUTH TWICE A DAY, Disp: 13 Inhaler, Rfl: 1     atorvastatin (LIPITOR) 40 MG tablet, TAKE 1 TABLET BY MOUTH DAILY, Disp: 90 tablet, Rfl: 1     Cetirizine HCl (ZYRTEC ALLERGY PO), Take  by mouth., Disp: , Rfl:      hydrocortisone (ANUSOL-HC) 2.5 % rectal cream, Place rectally 3 times daily as needed for hemorrhoids, Disp: 30 g, Rfl: 9     losartan-hydrochlorothiazide (HYZAAR) 100-25 MG tablet, Take 1 tablet by mouth daily, Disp: 30 tablet, Rfl: 0     VENTOLIN  (90 Base) MCG/ACT inhaler, INHALE 2 PUFFS INTO THE LUNGS EVERY 4 HOURS AS NEEDED FOR SHORTNESS OF BREATH / DYSPNEA OR WHEEZING, Disp: 18 Inhaler, Rfl: 3    Current Facility-Administered Medications:      albuterol (PROVENTIL) neb solution 2.5 mg, 2.5 mg, Nebulization, Once, Nguyen Murray MD     ALLERGIES:    Allergies   Allergen Reactions     No Known Drug Allergies      Seasonal Allergies         SOCIAL HISTORY:   Social History     Socioeconomic History     Marital status:      Spouse name: Not on file     Number of children: Not on file     Years of education: Not on file     Highest education level: Not on file   Occupational History     Occupation: Owns and runs [employer]     Employer: [employer]   Social Needs     Financial resource strain: Not on file     Food insecurity:     Worry: Not on file     Inability: Not on file     Transportation needs:     Medical: Not on file     Non-medical: Not on file   Tobacco Use     Smoking  status: Never Smoker     Smokeless tobacco: Never Used   Substance and Sexual Activity     Alcohol use: Yes     Alcohol/week: 3.3 - 5.0 standard drinks     Types: 4 - 6 Standard drinks or equivalent per week     Comment: 7-8 beers per week     Drug use: No     Sexual activity: Yes     Partners: Female     Comment:    Lifestyle     Physical activity:     Days per week: Not on file     Minutes per session: Not on file     Stress: Not on file   Relationships     Social connections:     Talks on phone: Not on file     Gets together: Not on file     Attends [employer] service: Not on file     Active member of club or organization: Not on file     Attends meetings of clubs or organizations: Not on file     Relationship status: Not on file     Intimate partner violence:     Fear of current or ex partner: Not on file     Emotionally abused: Not on file     Physically abused: Not on file     Forced sexual activity: Not on file   Other Topics Concern      Service Not Asked     Blood Transfusions Not Asked     Caffeine Concern Not Asked     Occupational Exposure Not Asked     Hobby Hazards Not Asked     Sleep Concern Not Asked     Stress Concern Not Asked     Weight Concern Not Asked     Special Diet No     Comment: tries with fruits and vegetables; limited dairy     Back Care Not Asked     Exercise Yes     Comment: 5-6 days per week; stretches; walking.     Bike Helmet Not Asked     Seat Belt Not Asked     Self-Exams Not Asked     Parent/sibling w/ CABG, MI or angioplasty before 65F 55M? Not Asked   Social History Narrative     Not on file        FAMILY HISTORY:   Family History   Problem Relation Age of Onset     C.A.D. Father         Had coronary artery bypass; late 60's.     Cancer Father         skin     Fractures Father         femur; just below hip     Alzheimer Disease Father      Eye Disorder Mother         cataracts; glaucoma, mac degeneration     Respiratory Mother         COPD     Colon Cancer No  "family hx of         EXAM:Vitals: Ht 1.82 m (5' 11.65\")   Wt 116.1 kg (256 lb)   BMI 35.06 kg/m     BMI= Body mass index is 35.06 kg/m .    General appearance: Patient is alert and fully cooperative with history & exam.  No sign of distress is noted during the visit.     Psychiatric: Affect is pleasant & appropriate.  Patient appears motivated to improve health.     Respiratory: Breathing is regular & unlabored while sitting.     HEENT: Hearing is intact to spoken word.  Speech is clear.  No gross evidence of visual impairment that would impact ambulation.     Dermatologic: Skin is intact to both lower extremities without significant lesions, rash or abrasion.  No paronychia or evidence of soft tissue infection is noted.     Vascular: DP & PT pulses are intact & regular bilaterally.  Swelling to left foot and ankle.  CFT and skin temperature is normal to both lower extremities.     Neurologic: Lower extremity sensation is intact to light touch.  No evidence of weakness or contracture in the lower extremities.  No evidence of neuropathy.     Musculoskeletal: Patient is ambulatory without assistive device or brace. Left foot and leg is swollen. Pain with dorsiflexion, plantarflexion and inversion of left foot over different areas of foot.     Radiographs:  Left foot and ankle xray -  I personally reviewed the xrays. Shows midfoot degenerative changes, enthesopathies base of 5th metatarsal and plantar heel spur. No fractures noted.      ASSESSMENT:    Left foot pain  Other secondary osteoarthritis of left foot     PLAN:  Reviewed patient[facility] in [facility]. Will order xrays today. I am wondering if this more tendon related or possible CRPS as the pain is out of proportion to the injury.     xrays show some arthritis. Talked about arthritis and treatments including orthotics, injections, icing, NSAIDS, bracing, physical therapy, compounding pain cream, or surgical intervention.    Recommend continue in boot at this time. " Will order mRI of the ankle and foot. If negative, may do EMG and neurology referral or possible rheumatology referral given that the pain seems to move around the foot. Will call with questions or concerns.        Stephani Ramirez DPM, Podiatry/Foot and Ankle Surgery    Weight management plan: Patient was referred to their PCP to discuss a diet and exercise plan.    Recommended to Nash Hauser to follow up with Primary Care provider regarding elevated blood pressure.        Again, thank you for allowing me to participate in the care of your patient.        Sincerely,        Stephani Ramirez DPM, Podiatry/Foot and Ankle Surgery

## 2019-12-11 NOTE — PATIENT INSTRUCTIONS
Thank you for choosing [facility]!    DR. SERRANO'S CLINIC SCHEDULE  MONDAY AM - [place] TUESDAY - [place] [place]   [address] [address] [address] [address] [place] [address]   [phone] / [phone] / [phone]       WEDNESDAY - [place] FRIDAY AM - [facility]   [address]   [place] [address] [place] [address]   [phone] / [phone]       FRIDAY PM - [place] SCHEDULE SURGERY: [phone]   [address] BILLING QUESTIONS: [phone]   [place] [address] AFTER HOURS: [phone] / [phone] APPOINTMENTS: [phone]     Consumer Price Line (CPL) [phone]       Please call to schedule your MRI/CT/Ultrasound/Arthrogram appointment.  The number is [phone].      We will call with MRI results        TENDONITIS   Tendons are the strong fibrous portions ofmuscles that attach to bones and allow the muscle to move a joint when it contracts. Tendons are very strong because they have a lot of force exerted on them. Sometimes tendons can become painful because they have suffered an acute injury, in which too much force was exerted at one time, or an overuse injury, in which a normal force was exerted too frequently or over a prolonged period of time. As a result, there is damage to the tendon and its surrounding soft tissue structures and they become inflammed. Because tendons do not have a great blood supply, they do not heal rapidly and the inflammation can become chronic.   Conservative treatment for tendinitis involves rest and anti-inflammatory measures. Ice is applied 15 minutes 2-3 times daily. Anti-inflammatory medications called NSAIDs (ibuprofen, example) can be taken provided they are used with caution, as they can lead to internal bleeding and increase the risk ofstroke and heart attack. Sometimes topical nitroglycerin is prescribed to help  with pain. Often your doctor will use a special shoe or removable walking cast to immobilize the tendon, allowing it to heal without further damage from use. These devices are very useful in helping tendons heal, but they may slow you down or make you feel like your hip, knee, or back are out ofalignment. This is temporary and should go away once you are out ofthe immobilization. You should not use a walking cast when showering or driving. Another option is Platelet Rich Plasma injections. (Normally done with a Sports and Orthorapedic doctor.   If conservative measures fail, your physician may need to surgically repair the tendon by removing any chronic inflammatory tissue and sewing it back together. Sometimes it is sewn to an adjacent tendon with similar function for support and sometimes it is lengthened. . Sometimes the bones around the tendon need to be realigned or reshaped to better support the tendon or prevent further damage. Your foot and ankle surgeon will discuss the specifics of your surgery with you, should you need it.    Towel stretch: Sit on a hard surface with your injured leg stretched out in front of you. Loop a towel around your toes and the ball of your foot and pull the towel toward your body keeping your leg straight. Hold this position for 15 to 30 seconds and then relax. Repeat 3 times. Then push the towel away with the ball of your foot. Repeat 3 times.  When you don't feel much of a stretch using the towel, you can start the standing calf stretch and the following exercises.  Standing calf stretch: Stand facing a wall with your hands on the wall at about eye level. Keep your injured leg back with your heel on the floor. Keep the other leg forward with the knee bent. Turn your back foot slightly inward (as if you were pigeon-toed). Slowly lean into the wall until you feel a stretch in the back of your calf. Hold the stretch for 15 to 30 seconds. Return to the starting position. Repeat 3  times. Do this exercise several times each day.   Standing soleus stretch: Stand facing a wall with your hands on the wall at about chest height. Keep your injured leg back with your heel on the floor. Keep the other leg forward with the knee bent. Turn your back foot slightly inward (as if you were pigeon-toed). Bend your back knee slightly and gently lean into the wall until you feel a stretch in the lower calf of your injured leg. Hold the stretch for 15 to 30 seconds. Return to the starting position. Repeat 3 times.   Achilles stretch: Stand with the ball of one foot on a stair. Reach for the step below with your heel until you feel a stretch in the arch of your foot. Hold this position for 15 to 30 seconds and then relax. Repeat 3 times.   Heel raise: Balance yourself while standing behind a chair or counter. Using the chair or counter as a support to help you, raise your body up onto your toes and hold for 5 seconds. Then slowly lower yourself down without holding onto the support. (It's OK to keep holding onto the support if you need to.) When this exercise becomes less painful, try lowering yourself down on the injured leg only. Repeat 15 times. Do 2 sets of 15. Rest 30 seconds between sets.   Step-up: Stand with the foot of your injured leg on a support 3 to 5 inches high (like a small step or block of wood). Keep your other foot flat on the floor. Shift your weight onto the injured leg on the support. Straighten your injured leg as the other leg comes off the floor. Return to the starting position by bending your injured leg and slowly lowering your uninjured leg back to the floor. Do 2 sets of 15.   Resisted ankle eversion: Sit with both legs stretched out in front of you, with your feet about a shoulder's width apart. Tie a loop in one end of elastic tubing. Put the foot of your injured leg through the loop so that the tubing goes around the arch of that foot and wraps around the outside of the other  foot. Hold onto the other end of the tubing with your hand to provide tension. Turn the foot of your injured leg up and out. Make sure you keep your other foot still so that it will allow the tubing to stretch as you move the foot of your injured leg. Return to the starting position. Do 2 sets of 15.   Balance and reach exercises: Stand next to a chair with your injured leg farther from the chair. The chair will provide support if you need it. Stand on the foot of your injured leg and bend your knee slightly. Try to raise the arch of this foot while keeping your big toe on the floor. Keep your foot in this position. With the hand that is farther away from the chair, reach forward in front of you by bending at the waist. Avoid bending your knee any more as you do this. Repeat this 10 times. To make the exercise more challenging, reach farther in front of you. Do 2 sets of 10.  the same position as above. While keeping your arch height, reach the hand that is farther away from the chair across your body toward the chair. The farther you reach, the more challenging the exercise. Do 2 sets of 10.     Resisted ankle eversion: Sit with both legs stretched out in front of you, with your feet about a shoulder's width apart. Tie a loop in one end of elastic tubing. Put the foot of your injured leg through the loop so that the tubing goes around the arch of that foot and wraps around the outside of the other foot. Hold onto the other end of the tubing with your hand to provide tension. Turn the foot of your injured leg up and out. Make sure you keep your other foot still so that it will allow the tubing to stretch as you move the foot of your injured leg. Return to the starting position. Do 2 sets of 15.   If you have access to a wobble board, do the following exercises:  Wobble board exercises:   Stand on a wobble board with your feet shoulder width apart. Rock the board forwards and backwards 30 times, then side to  side 30 times. Hold on to a chair if you need support.   Rotate the wobble board around so that the edge of the board is in contact with the floor at all times. Do this 30 times in a clockwise and then a counterclockwise direction.   Balance on the wobble board for as long as you can without letting the edges touch the floor. Try to do this for 2 minutes without touching the floor.   Rotate the wobble board in clockwise and counterclockwise circles, but do not let the edge of the board touch the floor.   When you have mastered exercises A through D, try repeating them while standing on just your injured leg.   After you are able to do these exercises on one leg, try to do them with your eyes closed. Make sure you have something nearby to support you in case you lose your balance.    OSTEOARTHRITIS OF THE FOOT & ANKLE  Osteoarthritis is a condition characterized by the breakdown and eventual loss of cartilage in one or more joints. Cartilage (the connective tissue found at the end of the bones in the joints) protects and cushions the bones during movement. When cartilage deteriorates or is lost, symptoms develop that can restrict one s ability to easily perform daily activities.  Osteoarthritis is also known as degenerative arthritis, reflecting its nature to develop as part of the aging process. As the most common form of arthritis, osteoarthritis affects millions of Americans. Some people refer to osteoarthritis simply as arthritis, even though there are many different types of arthritis.  Osteoarthritis appears at various joints throughout the body, including the hands, feet, spine, hips, and knees. In the foot, the disease most frequently occurs in the big toe, although it is also often found in the midfoot and ankle.  CAUSES  Osteoarthritis is considered a  wear and tear  disease because the cartilage in the joint wears down with repeated stress and use over time. As the cartilage deteriorates and gets thinner,  the bones lose their protective covering and eventually may rub together, causing pain and inflammation of the joint.  An injury may also lead to osteoarthritis, although it may take months or years after the injury for the condition to develop. For example, osteoarthritis in the big toe is often caused by kicking or jamming the toe, or by dropping something on the toe. Osteoarthritis in the midfoot is often caused by dropping something on it, or by a sprain or fracture. In the ankle, osteoarthritis is usually caused by a fracture and occasionally by a severe sprain.  Sometimes osteoarthritis develops as a result of abnormal foot mechanics such as flat feet or high arches. A flat foot causes less stability in the ligaments (bands of tissue that connect bones), resulting in excessive strain on the joints, which can cause arthritis. A high arch is rigid and lacks mobility, causing a jamming of joints that creates an increased risk of arthritis.  SYMPTOMS  People with osteoarthritis in the foot or ankle experience, in varying degrees, one or more of the following: Pain and stiffness in the joint, swelling in or near the joint, or difficulty walking or bending the joint.   Some patients with osteoarthritis also develop a bone spur (a bony protrusion) at the affected joint. Shoe pressure may cause pain at the site of a bone spur, and in some cases blisters or calluses may form over its surface. Bone spurs can also limit the movement of the joint.    DIAGNOSIS  In diagnosing osteoarthritis, the foot and ankle surgeon will examine the foot thoroughly, looking for swelling in the joint, limited mobility, and pain with movement. In some cases, deformity and/or enlargement (spur) of the joint may be noted. X-rays may be ordered to evaluate the extent of the disease.  NON-SURGICAL TREATMENT  To help relieve symptoms, the surgeon may begin treating osteoarthritis with one or more of the following non-surgical approaches:  Oral  medications. Nonsteroidal anti-inflammatory drugs (NSAIDs), such as ibuprofen, are often helpful in reducing the inflammation and pain. Occasionally a prescription for a steroid medication is needed to adequately reduce symptoms.   Orthotic devices. Custom orthotic devices (shoe inserts) are often prescribed to provide support to improve the foot s mechanics or cushioning to help minimize pain.   Bracing. Bracing, which restricts motion and supports the joint, can reduce pain during walking and help prevent further deformity.   Immobilization. Protecting the foot from movement by wearing a cast or removable cast-boot may be necessary to allow the inflammation to resolve.   Steroid injections. In some cases, steroid injections are applied to the affected joint to deliver anti-inflammatory medication.   Physical therapy. Exercises to strengthen the muscles, especially when the osteoarthritis occurs in the ankle, may give the patient greater stability and help avoid injury that might worsen the condition.   DO I NEED SURGERY?  When osteoarthritis has progressed substantially or failed to improve with non-surgical treatment, surgery may be recommended. In advanced cases, surgery may be the only option. The goal of surgery is to decrease pain and improve function. The foot and ankle surgeon will consider a number of factors when selecting the procedure best suited to the patient s condition and lifestyle.          BODY WEIGHT AND YOUR FEET  The following information is included in the after visit summary for all patients. Body weight can be a sensitive issue to discuss in clinic, but we think the following information is very important. Although we focus on the feet and ankles, we do support the overall health of our patients.     Many things can cause foot and ankle problems. Foot structure, activity level, foot mechanics and injuries are common causes of pain. One very important issue that often goes unmentioned, is  body weight. Extra weight can cause increased stress on muscles, ligaments, bones and tendons. Sometimes just a few extra pounds is all it takes to put one over her/his threshold. Without reducing that stress, it can be difficult to alleviate pain. As Foot & Ankle specialists, our job is addressing the lower extremity problem and possible causes. Regarding extra body weight, we encourage patients to discuss diet and weight management plans with their primary care doctors. It is this team approach that gives you the best opportunity for pain relief and getting you back on your feet.      [facility] has a Comprehensive Weight Management Program. This program includes counseling, education, non-surgical and surgical approaches to weight loss. If you are interested in learning more either talk to you primary care provider or call [phone].

## 2019-12-11 NOTE — PROGRESS NOTES
PATIENT HISTORY:   Nash Hauser is a 54 year old male who presents to clinic for left foot pain and swelling.  Notes that he set a heavy piece of furniture on it about 2 weeks ago. The next day it became really swollen and painful and he could not walk on it. Put himself in a boot that he had and has been able to walk in that but pain is still 9/10. Can't walk without the boot. Here with wife. Also notes that he gets intermittent pain and swelling to right foot and leg for years. Will start with cramping in the leg at night and then foot will hurt for a few weeks. Right foot not painful or swollen today. Wondering what is causing the pain to left foot and what is going on with the right foot.     Review of Systems:  Patient denies fever, chills, rash, wound,  numbness, weakness, heart burn, blood in stool, chest pain with activity, calf pain when walking, shortness of breath with activity, chronic cough, easy bleeding/bruising, excessive thirst, fatigue, depression, anxiety.  Patient admits to limping, swelling, stiffness.     PAST MEDICAL HISTORY:   Past Medical History:   Diagnosis Date     Hypertension      Other and unspecified hyperlipidemia      Other genital herpes     Uses oral Valtrex and topical acyclovir when gets flareup     Renal disease     Only one functioning kidney     Unilateral small kidney     Atrophic rt kidney        PAST SURGICAL HISTORY:   Past Surgical History:   Procedure Laterality Date     BACK SURGERY  2002    Lumbar microdiskectomy     C NONSPECIFIC PROCEDURE  1992    tonsillectomy     C NONSPECIFIC PROCEDURE  2004    Another diskectomy     COLONOSCOPY N/A 11/8/2017    repeat 10 years.Procedure: COLONOSCOPY;  COLONOSCOPY;  Surgeon: Oral Goodman MD;  Location:  GI     ELBOW SURGERY  2002    R Elbow surgery; incl resection of portion of radial head     EXCISE EXOSTOSIS TOE(S)  9/23/2013    Procedure: EXCISE EXOSTOSIS TOE(S);  Left Third Toe Bone Spur removal ;  Surgeon: Ashley  Stephani MANZO DPM, Podiatr;  Location: [facility] OR     SURGICAL HISTORY OF -       LASIK right eye; did not work as has some kind of basement membrane disorder.        MEDICATIONS:   Current Outpatient Medications:      ASMANEX  MCG/ACT inhaler, TAKE 2 PUFFS BY MOUTH TWICE A DAY, Disp: 13 Inhaler, Rfl: 1     atorvastatin (LIPITOR) 40 MG tablet, TAKE 1 TABLET BY MOUTH DAILY, Disp: 90 tablet, Rfl: 1     Cetirizine HCl (ZYRTEC ALLERGY PO), Take  by mouth., Disp: , Rfl:      hydrocortisone (ANUSOL-HC) 2.5 % rectal cream, Place rectally 3 times daily as needed for hemorrhoids, Disp: 30 g, Rfl: 9     losartan-hydrochlorothiazide (HYZAAR) 100-25 MG tablet, Take 1 tablet by mouth daily, Disp: 30 tablet, Rfl: 0     VENTOLIN  (90 Base) MCG/ACT inhaler, INHALE 2 PUFFS INTO THE LUNGS EVERY 4 HOURS AS NEEDED FOR SHORTNESS OF BREATH / DYSPNEA OR WHEEZING, Disp: 18 Inhaler, Rfl: 3    Current Facility-Administered Medications:      albuterol (PROVENTIL) neb solution 2.5 mg, 2.5 mg, Nebulization, Once, Nguyen Murray MD     ALLERGIES:    Allergies   Allergen Reactions     No Known Drug Allergies      Seasonal Allergies         SOCIAL HISTORY:   Social History     Socioeconomic History     Marital status:      Spouse name: Not on file     Number of children: Not on file     Years of education: Not on file     Highest education level: Not on file   Occupational History     Occupation: Owns and runs party equipment [employer]     Employer: [employer]   Social Needs     Financial resource strain: Not on file     Food insecurity:     Worry: Not on file     Inability: Not on file     Transportation needs:     Medical: Not on file     Non-medical: Not on file   Tobacco Use     Smoking status: Never Smoker     Smokeless tobacco: Never Used   Substance and Sexual Activity     Alcohol use: Yes     Alcohol/week: 3.3 - 5.0 standard drinks     Types: 4 - 6 Standard drinks or equivalent per week     Comment: 7-8 beers per week      "Drug use: No     Sexual activity: Yes     Partners: Female     Comment:    Lifestyle     Physical activity:     Days per week: Not on file     Minutes per session: Not on file     Stress: Not on file   Relationships     Social connections:     Talks on phone: Not on file     Gets together: Not on file     Attends [employer] service: Not on file     Active member of club or organization: Not on file     Attends meetings of clubs or organizations: Not on file     Relationship status: Not on file     Intimate partner violence:     Fear of current or ex partner: Not on file     Emotionally abused: Not on file     Physically abused: Not on file     Forced sexual activity: Not on file   Other Topics Concern      Service Not Asked     Blood Transfusions Not Asked     Caffeine Concern Not Asked     Occupational Exposure Not Asked     Hobby Hazards Not Asked     Sleep Concern Not Asked     Stress Concern Not Asked     Weight Concern Not Asked     Special Diet No     Comment: tries with fruits and vegetables; limited dairy     Back Care Not Asked     Exercise Yes     Comment: 5-6 days per week; stretches; walking.     Bike Helmet Not Asked     Seat Belt Not Asked     Self-Exams Not Asked     Parent/sibling w/ CABG, MI or angioplasty before 65F 55M? Not Asked   Social History Narrative     Not on file        FAMILY HISTORY:   Family History   Problem Relation Age of Onset     C.A.D. Father         Had coronary artery bypass; late 60's.     Cancer Father         skin     Fractures Father         femur; just below hip     Alzheimer Disease Father      Eye Disorder Mother         cataracts; glaucoma, mac degeneration     Respiratory Mother         COPD     Colon Cancer No family hx of         EXAM:Vitals: Ht 1.82 m (5' 11.65\")   Wt 116.1 kg (256 lb)   BMI 35.06 kg/m    BMI= Body mass index is 35.06 kg/m .    General appearance: Patient is alert and fully cooperative with history & exam.  No sign of distress is " noted during the visit.     Psychiatric: Affect is pleasant & appropriate.  Patient appears motivated to improve health.     Respiratory: Breathing is regular & unlabored while sitting.     HEENT: Hearing is intact to spoken word.  Speech is clear.  No gross evidence of visual impairment that would impact ambulation.     Dermatologic: Skin is intact to both lower extremities without significant lesions, rash or abrasion.  No paronychia or evidence of soft tissue infection is noted.     Vascular: DP & PT pulses are intact & regular bilaterally.  Swelling to left foot and ankle.  CFT and skin temperature is normal to both lower extremities.     Neurologic: Lower extremity sensation is intact to light touch.  No evidence of weakness or contracture in the lower extremities.  No evidence of neuropathy.     Musculoskeletal: Patient is ambulatory without assistive device or brace. Left foot and leg is swollen. Pain with dorsiflexion, plantarflexion and inversion of left foot over different areas of foot.     Radiographs:  Left foot and ankle xray -  I personally reviewed the xrays. Shows midfoot degenerative changes, enthesopathies base of 5th metatarsal and plantar heel spur. No fractures noted.      ASSESSMENT:    Left foot pain  Other secondary osteoarthritis of left foot     PLAN:  Reviewed patient[facility] in [facility]. Will order xrays today. I am wondering if this more tendon related or possible CRPS as the pain is out of proportion to the injury.     xrays show some arthritis. Talked about arthritis and treatments including orthotics, injections, icing, NSAIDS, bracing, physical therapy, compounding pain cream, or surgical intervention.    Recommend continue in boot at this time. Will order mRI of the ankle and foot. If negative, may do EMG and neurology referral or possible rheumatology referral given that the pain seems to move around the foot. Will call with questions or concerns.        Stephani Ramirez DPM,  Podiatry/Foot and Ankle Surgery    Weight management plan: Patient was referred to their PCP to discuss a diet and exercise plan.    Recommended to Nash Hauser to follow up with Primary Care provider regarding elevated blood pressure.

## 2019-12-17 DIAGNOSIS — M79.672 LEFT FOOT PAIN: ICD-10-CM

## 2019-12-17 DIAGNOSIS — M62.40 ABNORMALLY INCREASED MUSCLE CONTRACTION: ICD-10-CM

## 2019-12-18 RX ORDER — LOSARTAN POTASSIUM AND HYDROCHLOROTHIAZIDE 25; 100 MG/1; MG/1
TABLET ORAL
Qty: 30 TABLET | Refills: 3 | Status: SHIPPED | OUTPATIENT
Start: 2019-12-18 | End: 2019-12-24

## 2019-12-20 DIAGNOSIS — M79.672 LEFT FOOT PAIN: ICD-10-CM

## 2019-12-20 DIAGNOSIS — I10 HYPERTENSION GOAL BP (BLOOD PRESSURE) < 140/90: Primary | ICD-10-CM

## 2019-12-20 DIAGNOSIS — M62.40 ABNORMALLY INCREASED MUSCLE CONTRACTION: ICD-10-CM

## 2019-12-20 RX ORDER — LOSARTAN POTASSIUM AND HYDROCHLOROTHIAZIDE 25; 100 MG/1; MG/1
1 TABLET ORAL DAILY
Qty: 30 TABLET | Refills: 3 | OUTPATIENT
Start: 2019-12-20

## 2019-12-20 NOTE — TELEPHONE ENCOUNTER
Alternative requested: combo unavailable. Please send individual RXS.    Pharm Tel: [phone]  Pharm Fax: [phone]

## 2019-12-24 RX ORDER — HYDROCHLOROTHIAZIDE 25 MG/1
25 TABLET ORAL DAILY
Qty: 90 TABLET | Refills: 0 | Status: SHIPPED | OUTPATIENT
Start: 2019-12-24 | End: 2020-03-23

## 2019-12-24 RX ORDER — LOSARTAN POTASSIUM 100 MG/1
100 TABLET ORAL DAILY
Qty: 90 TABLET | Refills: 0 | Status: SHIPPED | OUTPATIENT
Start: 2019-12-24 | End: 2020-03-23

## 2019-12-24 NOTE — TELEPHONE ENCOUNTER
Did send in 90 days of each separately.    Please call and see if he has been to Nephrology.  If not, remind him.  If so, see about getting records.    Thanks!

## 2019-12-24 NOTE — TELEPHONE ENCOUNTER
SANIA - Dr. Murray -     Called and advised of below.      He has an appt January 14, 2020 with Nephrology.  He will try to get us records.

## 2020-01-08 ENCOUNTER — TRANSFERRED RECORDS (OUTPATIENT)
Dept: HEALTH INFORMATION MANAGEMENT | Facility: CLINIC | Age: 55
End: 2020-01-08

## 2020-03-12 ENCOUNTER — TRANSFERRED RECORDS (OUTPATIENT)
Dept: HEALTH INFORMATION MANAGEMENT | Facility: CLINIC | Age: 55
End: 2020-03-12

## 2020-10-28 ENCOUNTER — TRANSFERRED RECORDS (OUTPATIENT)
Dept: HEALTH INFORMATION MANAGEMENT | Facility: CLINIC | Age: 55
End: 2020-10-28

## 2020-11-13 ENCOUNTER — TRANSFERRED RECORDS (OUTPATIENT)
Dept: HEALTH INFORMATION MANAGEMENT | Facility: CLINIC | Age: 55
End: 2020-11-13

## 2020-12-06 ENCOUNTER — HEALTH MAINTENANCE LETTER (OUTPATIENT)
Age: 55
End: 2020-12-06

## 2021-07-20 ENCOUNTER — OFFICE VISIT (OUTPATIENT)
Dept: FAMILY MEDICINE | Facility: CLINIC | Age: 56
End: 2021-07-20
Payer: COMMERCIAL

## 2021-07-20 VITALS
DIASTOLIC BLOOD PRESSURE: 80 MMHG | RESPIRATION RATE: 18 BRPM | WEIGHT: 274 LBS | SYSTOLIC BLOOD PRESSURE: 126 MMHG | TEMPERATURE: 97.5 F | HEART RATE: 74 BPM | OXYGEN SATURATION: 97 % | HEIGHT: 72 IN | BODY MASS INDEX: 37.11 KG/M2

## 2021-07-20 DIAGNOSIS — Z11.4 SCREENING FOR HIV (HUMAN IMMUNODEFICIENCY VIRUS): ICD-10-CM

## 2021-07-20 DIAGNOSIS — Z12.5 SCREENING FOR PROSTATE CANCER: ICD-10-CM

## 2021-07-20 DIAGNOSIS — I10 HYPERTENSION GOAL BP (BLOOD PRESSURE) < 140/90: ICD-10-CM

## 2021-07-20 DIAGNOSIS — E66.01 MORBID OBESITY (H): ICD-10-CM

## 2021-07-20 DIAGNOSIS — J45.40 MODERATE PERSISTENT ASTHMA, UNSPECIFIED WHETHER COMPLICATED: ICD-10-CM

## 2021-07-20 DIAGNOSIS — N27.0 UNILATERAL SMALL KIDNEY: ICD-10-CM

## 2021-07-20 DIAGNOSIS — R73.01 IMPAIRED FASTING GLUCOSE: ICD-10-CM

## 2021-07-20 DIAGNOSIS — Z11.59 NEED FOR HEPATITIS C SCREENING TEST: ICD-10-CM

## 2021-07-20 DIAGNOSIS — E78.5 HYPERLIPIDEMIA WITH TARGET LDL LESS THAN 130: ICD-10-CM

## 2021-07-20 DIAGNOSIS — J30.89 ALLERGIC RHINITIS DUE TO OTHER ALLERGIC TRIGGER, UNSPECIFIED SEASONALITY: ICD-10-CM

## 2021-07-20 DIAGNOSIS — Z00.00 ROUTINE GENERAL MEDICAL EXAMINATION AT A HEALTH CARE FACILITY: Primary | ICD-10-CM

## 2021-07-20 PROCEDURE — 99396 PREV VISIT EST AGE 40-64: CPT | Performed by: PHYSICIAN ASSISTANT

## 2021-07-20 RX ORDER — ALLOPURINOL 300 MG/1
300 TABLET ORAL DAILY
COMMUNITY

## 2021-07-20 ASSESSMENT — ENCOUNTER SYMPTOMS
DIZZINESS: 0
COUGH: 0
HEARTBURN: 0
SORE THROAT: 0
DIARRHEA: 0
NAUSEA: 0
PARESTHESIAS: 0
PALPITATIONS: 0
CHILLS: 0
HEMATURIA: 0
ABDOMINAL PAIN: 0
EYE PAIN: 0
CONSTIPATION: 0
HEADACHES: 0
JOINT SWELLING: 0
ARTHRALGIAS: 0
WEAKNESS: 0
SHORTNESS OF BREATH: 0
HEMATOCHEZIA: 0
NERVOUS/ANXIOUS: 0
MYALGIAS: 0
FEVER: 0
DYSURIA: 0
FREQUENCY: 0

## 2021-07-20 ASSESSMENT — MIFFLIN-ST. JEOR: SCORE: 2107.92

## 2021-07-20 ASSESSMENT — PAIN SCALES - GENERAL: PAINLEVEL: NO PAIN (0)

## 2021-07-20 NOTE — PROGRESS NOTES
SUBJECTIVE:   CC: Nash Hauser is an 56 year old male who presents for preventative health visit.     Patient has been advised of split billing requirements and indicates understanding: Yes  Healthy Habits:     Getting at least 3 servings of Calcium per day:  NO    Bi-annual eye exam:  Yes    Dental care twice a year:  Yes    Sleep apnea or symptoms of sleep apnea:  Excessive snoring    Diet:  Low salt    Frequency of exercise:  2-3 days/week    Duration of exercise:  15-30 minutes    Taking medications regularly:  Yes    Medication side effects:  None    PHQ-2 Total Score: 0    Additional concerns today:  No    Today's PHQ-2 Score:   PHQ-2 ( 1999 Pfizer) 7/20/2021   Q1: Little interest or pleasure in doing things 0   Q2: Feeling down, depressed or hopeless 0   PHQ-2 Score 0   Q1: Little interest or pleasure in doing things Not at all   Q2: Feeling down, depressed or hopeless Not at all   PHQ-2 Score 0       Abuse: Current or Past(Physical, Sexual or Emotional)- No  Do you feel safe in your environment? Yes    Have you ever done Advance Care Planning? (For example, a Health Directive, POLST, or a discussion with a medical provider or your loved ones about your wishes): No, advance care planning information given to patient to review.  Patient declined advance care planning discussion at this time.    Social History     Tobacco Use     Smoking status: Never Smoker     Smokeless tobacco: Never Used   Substance Use Topics     Alcohol use: Yes     Alcohol/week: 3.3 - 5.0 standard drinks     Types: 4 - 6 Standard drinks or equivalent per week     Comment: 7-8 beers per week     If you drink alcohol do you typically have >3 drinks per day or >7 drinks per week? No    Alcohol Use 7/20/2021   Prescreen: >3 drinks/day or >7 drinks/week? Yes   Prescreen: >3 drinks/day or >7 drinks/week? -   AUDIT SCORE  4       Last PSA:   PSA   Date Value Ref Range Status   04/25/2019 0.72 0 - 4 ug/L Final     Comment:     Assay Method:   Chemiluminescence using Siemens Vista analyzer       Reviewed orders with patient. Reviewed health maintenance and updated orders accordingly - Yes  Lab work is in process  Labs reviewed in EPIC  BP Readings from Last 3 Encounters:   07/20/21 126/80   12/11/19 130/78   04/30/19 138/88    Wt Readings from Last 3 Encounters:   07/20/21 124.3 kg (274 lb)   12/11/19 116.1 kg (256 lb)   04/30/19 116.2 kg (256 lb 1.6 oz)                  Patient Active Problem List   Diagnosis     Unilateral small kidney     Other genital herpes     Impaired fasting glucose     Hyperlipidemia with target LDL less than 130     Hypertension goal BP (blood pressure) < 140/90     Elevated BMI (H)     Past Surgical History:   Procedure Laterality Date     BACK SURGERY  2002    Lumbar microdiskectomy     COLONOSCOPY N/A 11/8/2017    repeat 10 years.Procedure: COLONOSCOPY;  COLONOSCOPY;  Surgeon: Oral Goodman MD;  Location:  GI     ELBOW SURGERY  2002    R Elbow surgery; incl resection of portion of radial head     EXCISE EXOSTOSIS TOE(S)  9/23/2013    Procedure: EXCISE EXOSTOSIS TOE(S);  Left Third Toe Bone Spur removal ;  Surgeon: Stephani Ramirez DPM, Podiatr;  Location:  OR     SURGICAL HISTORY OF -       LASIK right eye; did not work as has some kind of basement membrane disorder.     [facility] NONSPECIFIC PROCEDURE  1992    tonsillectomy     [facility] NONSPECIFIC PROCEDURE  2004    Another diskectomy       Social History     Tobacco Use     Smoking status: Never Smoker     Smokeless tobacco: Never Used   Substance Use Topics     Alcohol use: Yes     Alcohol/week: 3.3 - 5.0 standard drinks     Types: 4 - 6 Standard drinks or equivalent per week     Comment: 7-8 beers per week     Family History   Problem Relation Age of Onset     C.A.D. Father         Had coronary artery bypass; late 60's.     Cancer Father         skin     Fractures Father         femur; just below hip     Alzheimer Disease Father      Eye Disorder Mother         cataracts;  glaucoma, mac degeneration     Respiratory Mother         COPD     Lung Cancer Mother      Breast Cancer Mother      Colon Cancer No family hx of      Prostate Cancer No family hx of          Current Outpatient Medications   Medication Sig Dispense Refill     allopurinol (ZYLOPRIM) 300 MG tablet Take 300 mg by mouth daily       atorvastatin (LIPITOR) 40 MG tablet TAKE 1 TABLET BY MOUTH DAILY 90 tablet 1     Cetirizine HCl (ZYRTEC ALLERGY PO) Take  by mouth.       fluticasone-vilanterol (BREO ELLIPTA) 100-25 MCG/INH inhaler INHALE 1 PUFF BY MOUTH AS DIRECTED ONCE A DAY       hydrochlorothiazide (HYDRODIURIL) 25 MG tablet TAKE 1 TABLET BY MOUTH EVERY DAY 90 tablet 0     losartan (COZAAR) 100 MG tablet TAKE 1 TABLET BY MOUTH EVERY DAY 90 tablet 0     diclofenac (VOLTAREN) 1 % topical gel Place 2 g onto the skin 4 times daily (Patient not taking: Reported on 7/20/2021) 100 g 1     hydrocortisone (ANUSOL-HC) 2.5 % rectal cream Place rectally 3 times daily as needed for hemorrhoids (Patient not taking: Reported on 7/20/2021) 30 g 9     VENTOLIN  (90 Base) MCG/ACT inhaler INHALE 2 PUFFS INTO THE LUNGS EVERY 4 HOURS AS NEEDED FOR SHORTNESS OF BREATH / DYSPNEA OR WHEEZING (Patient not taking: Reported on 7/20/2021) 18 Inhaler 3     Allergies   Allergen Reactions     No Known Drug Allergies      Seasonal Allergies      Recent Labs   Lab Test 04/25/19  0837 04/05/18  0837 05/25/17  0825 10/04/16  0916   A1C 5.6 5.6  --  5.4   LDL 92 97 81 156*   HDL 37* 40 40 40   TRIG 121 106 91 207*   ALT 24 39 25 26   CR 1.48* 1.36*  --  1.48*   GFRESTIMATED 53* 55*  --  50*   GFRESTBLACK 62 67  --  61   POTASSIUM 4.3 4.2  --  4.6        Reviewed and updated as needed this visit by clinical staff  Tobacco  Allergies  Meds  Problems  Med Hx  Surg Hx  Fam Hx          Reviewed and updated as needed this visit by Provider  Tobacco  Allergies  Meds  Problems  Med Hx  Surg Hx  Fam Hx             Review of Systems  "  Constitutional: Negative for chills and fever.   HENT: Negative for congestion, ear pain, hearing loss and sore throat.    Eyes: Negative for pain and visual disturbance.   Respiratory: Negative for cough and shortness of breath.    Cardiovascular: Negative for chest pain, palpitations and peripheral edema.   Gastrointestinal: Negative for abdominal pain, constipation, diarrhea, heartburn, hematochezia and nausea.   Genitourinary: Negative for discharge, dysuria, frequency, genital sores, hematuria, impotence and urgency.   Musculoskeletal: Negative for arthralgias, joint swelling and myalgias.   Skin: Negative for rash.   Neurological: Negative for dizziness, weakness, headaches and paresthesias.   Psychiatric/Behavioral: Negative for mood changes. The patient is not nervous/anxious.      OBJECTIVE:   /80   Pulse 74   Temp 97.5  F (36.4  C) (Oral)   Resp 18   Ht 1.816 m (5' 11.5\")   Wt 124.3 kg (274 lb)   SpO2 97%   BMI 37.68 kg/m      Physical Exam  GENERAL: healthy, alert and no distress  EYES: Eyes grossly normal to inspection, PERRL and conjunctivae and sclerae normal  HENT: ear canals and TM's normal, nose and mouth without ulcers or lesions  NECK: no adenopathy, no asymmetry, masses, or scars and thyroid normal to palpation  RESP: lungs clear to auscultation - no rales, rhonchi or wheezes  CV: regular rate and rhythm, normal S1 S2, no S3 or S4, no murmur, click or rub, no peripheral edema and peripheral pulses strong  ABDOMEN: soft, nontender, no hepatosplenomegaly, no masses and bowel sounds normal  MS: no gross musculoskeletal defects noted, no edema  SKIN: no suspicious lesions or rashes  NEURO: Normal strength and tone, mentation intact and speech normal  PSYCH: mentation appears normal, affect normal/bright    Diagnostic Test Results:  Labs reviewed in Epic    ASSESSMENT/PLAN:   1. Routine general medical examination at a health care facility  Reviewed personal and family history. Reviewed " age appropriate screenings. Recommended any needed vaccinations. Continue to focus on well balanced diet and exercise.   - Comprehensive metabolic panel (BMP + Alb, Alk Phos, ALT, AST, Total. Bili, TP); Future    2. Screening for HIV (human immunodeficiency virus)  Discussed, agrees  - HIV Antigen Antibody Combo; Future    3. Need for hepatitis C screening test  Discussed, agrees  - Hepatitis C Screen Reflex to HCV RNA Quant and Genotype; Future    4. Screening for prostate cancer  Discussed, agrees  - PSA, screen; Future    5. Impaired fasting glucose  Check A1c  - Hemoglobin A1c; Future    6. Morbid obesity (H)  Discussed healthy lifestyle    7. Hypertension goal BP (blood pressure) < 140/90  Chronic, well controlled. On losartan and hydrochlorothiazide, prescribed by nephrologist.  - Comprehensive metabolic panel (BMP + Alb, Alk Phos, ALT, AST, Total. Bili, TP); Future  - Albumin Random Urine Quantitative with Creat Ratio; Future    8. Hyperlipidemia with target LDL less than 130  Chronic, he takes lipitor 40 mg every few days. He has tried several statin medications in the past but gets muscle aches. No muscle aches if he takes it less often. Will check lipids.  - Lipid panel reflex to direct LDL Fasting; Future    9. Unilateral small kidney  Chronic, follows with nephrology.  - Comprehensive metabolic panel (BMP + Alb, Alk Phos, ALT, AST, Total. Bili, TP); Future    10. Allergic rhinitis due to other allergic trigger, unspecified seasonality  11. Moderate persistent asthma, unspecified whether complicated  Chronic, follows with [facility]. He is seeing them in the next few weeks. Reports symptoms are stable.    Patient has been advised of split billing requirements and indicates understanding: Yes  COUNSELING:   Reviewed preventive health counseling, as reflected in patient instructions    Estimated body mass index is 37.68 kg/m  as calculated from the following:    Height as of this encounter:  "1.816 m (5' 11.5\").    Weight as of this encounter: 124.3 kg (274 lb).     Weight management plan: Discussed healthy diet and exercise guidelines    He reports that he has never smoked. He has never used smokeless tobacco.      Counseling Resources:  ATP IV Guidelines  Pooled Cohorts Equation Calculator  FRAX Risk Assessment  ICSI Preventive Guidelines  Dietary Guidelines for Americans, 2010  USDA's MyPlate  ASA Prophylaxis  Lung CA Screening    Alicia Jacobs PA-C  [facility]  "

## 2021-07-28 ENCOUNTER — TRANSFERRED RECORDS (OUTPATIENT)
Dept: HEALTH INFORMATION MANAGEMENT | Facility: CLINIC | Age: 56
End: 2021-07-28

## 2021-08-03 ENCOUNTER — LAB (OUTPATIENT)
Dept: LAB | Facility: CLINIC | Age: 56
End: 2021-08-03
Payer: COMMERCIAL

## 2021-08-03 DIAGNOSIS — I10 HYPERTENSION GOAL BP (BLOOD PRESSURE) < 140/90: ICD-10-CM

## 2021-08-03 DIAGNOSIS — E78.5 HYPERLIPIDEMIA WITH TARGET LDL LESS THAN 130: ICD-10-CM

## 2021-08-03 DIAGNOSIS — N27.0 UNILATERAL SMALL KIDNEY: ICD-10-CM

## 2021-08-03 DIAGNOSIS — R73.01 IMPAIRED FASTING GLUCOSE: ICD-10-CM

## 2021-08-03 DIAGNOSIS — Z00.00 ROUTINE GENERAL MEDICAL EXAMINATION AT A HEALTH CARE FACILITY: ICD-10-CM

## 2021-08-03 DIAGNOSIS — Z11.4 SCREENING FOR HIV (HUMAN IMMUNODEFICIENCY VIRUS): ICD-10-CM

## 2021-08-03 DIAGNOSIS — Z12.5 SCREENING FOR PROSTATE CANCER: ICD-10-CM

## 2021-08-03 DIAGNOSIS — Z11.59 NEED FOR HEPATITIS C SCREENING TEST: ICD-10-CM

## 2021-08-03 LAB
ALBUMIN SERPL-MCNC: 3.6 G/DL (ref 3.4–5)
ALP SERPL-CCNC: 74 U/L (ref 40–150)
ALT SERPL W P-5'-P-CCNC: 26 U/L (ref 0–70)
ANION GAP SERPL CALCULATED.3IONS-SCNC: 4 MMOL/L (ref 3–14)
AST SERPL W P-5'-P-CCNC: 18 U/L (ref 0–45)
BILIRUB SERPL-MCNC: 0.4 MG/DL (ref 0.2–1.3)
BUN SERPL-MCNC: 22 MG/DL (ref 7–30)
CALCIUM SERPL-MCNC: 8.9 MG/DL (ref 8.5–10.1)
CHLORIDE BLD-SCNC: 107 MMOL/L (ref 94–109)
CHOLEST SERPL-MCNC: 239 MG/DL
CO2 SERPL-SCNC: 27 MMOL/L (ref 20–32)
CREAT SERPL-MCNC: 1.41 MG/DL (ref 0.66–1.25)
CREAT UR-MCNC: 91 MG/DL
FASTING STATUS PATIENT QL REPORTED: YES
GFR SERPL CREATININE-BSD FRML MDRD: 56 ML/MIN/1.73M2
GLUCOSE BLD-MCNC: 107 MG/DL (ref 70–99)
HBA1C MFR BLD: 5.6 % (ref 0–5.6)
HCV AB SERPL QL IA: NONREACTIVE
HDLC SERPL-MCNC: 42 MG/DL
HIV 1+2 AB+HIV1 P24 AG SERPL QL IA: NONREACTIVE
LDLC SERPL CALC-MCNC: 162 MG/DL
MICROALBUMIN UR-MCNC: <5 MG/L
MICROALBUMIN/CREAT UR: NORMAL MG/G{CREAT}
NONHDLC SERPL-MCNC: 197 MG/DL
POTASSIUM BLD-SCNC: 4.3 MMOL/L (ref 3.4–5.3)
PROT SERPL-MCNC: 7 G/DL (ref 6.8–8.8)
PSA SERPL-MCNC: 0.62 UG/L (ref 0–4)
SODIUM SERPL-SCNC: 138 MMOL/L (ref 133–144)
TRIGL SERPL-MCNC: 176 MG/DL

## 2021-08-03 PROCEDURE — 80061 LIPID PANEL: CPT

## 2021-08-03 PROCEDURE — 86803 HEPATITIS C AB TEST: CPT

## 2021-08-03 PROCEDURE — 80053 COMPREHEN METABOLIC PANEL: CPT

## 2021-08-03 PROCEDURE — 36415 COLL VENOUS BLD VENIPUNCTURE: CPT

## 2021-08-03 PROCEDURE — 83036 HEMOGLOBIN GLYCOSYLATED A1C: CPT

## 2021-08-03 PROCEDURE — 87389 HIV-1 AG W/HIV-1&-2 AB AG IA: CPT

## 2021-08-03 PROCEDURE — 82043 UR ALBUMIN QUANTITATIVE: CPT

## 2021-08-03 PROCEDURE — G0103 PSA SCREENING: HCPCS

## 2021-09-25 ENCOUNTER — HEALTH MAINTENANCE LETTER (OUTPATIENT)
Age: 56
End: 2021-09-25

## 2021-12-02 ENCOUNTER — TRANSFERRED RECORDS (OUTPATIENT)
Dept: HEALTH INFORMATION MANAGEMENT | Facility: CLINIC | Age: 56
End: 2021-12-02
Payer: COMMERCIAL

## 2021-12-22 ENCOUNTER — TRANSFERRED RECORDS (OUTPATIENT)
Dept: HEALTH INFORMATION MANAGEMENT | Facility: CLINIC | Age: 56
End: 2021-12-22
Payer: COMMERCIAL

## 2022-01-04 ENCOUNTER — TRANSFERRED RECORDS (OUTPATIENT)
Dept: HEALTH INFORMATION MANAGEMENT | Facility: CLINIC | Age: 57
End: 2022-01-04
Payer: COMMERCIAL

## 2022-01-12 ENCOUNTER — IMMUNIZATION (OUTPATIENT)
Dept: NURSING | Facility: CLINIC | Age: 57
End: 2022-01-12
Payer: COMMERCIAL

## 2022-01-12 PROCEDURE — 0064A COVID-19,PF,MODERNA (18+ YRS BOOSTER .25ML): CPT

## 2022-01-12 PROCEDURE — 91306 COVID-19,PF,MODERNA (18+ YRS BOOSTER .25ML): CPT

## 2022-02-07 ENCOUNTER — TRANSFERRED RECORDS (OUTPATIENT)
Dept: HEALTH INFORMATION MANAGEMENT | Facility: CLINIC | Age: 57
End: 2022-02-07
Payer: COMMERCIAL

## 2022-07-08 ENCOUNTER — DOCUMENTATION ONLY (OUTPATIENT)
Dept: LAB | Facility: CLINIC | Age: 57
End: 2022-07-08

## 2022-07-08 DIAGNOSIS — N27.0 UNILATERAL SMALL KIDNEY: ICD-10-CM

## 2022-07-08 DIAGNOSIS — R73.01 IMPAIRED FASTING GLUCOSE: Primary | ICD-10-CM

## 2022-07-08 DIAGNOSIS — E78.5 HYPERLIPIDEMIA WITH TARGET LDL LESS THAN 130: ICD-10-CM

## 2022-07-08 DIAGNOSIS — N18.31 CHRONIC KIDNEY DISEASE, STAGE 3A (H): ICD-10-CM

## 2022-07-08 DIAGNOSIS — Z12.5 SCREENING FOR PROSTATE CANCER: ICD-10-CM

## 2022-07-08 NOTE — PROGRESS NOTES
This patient has an upcoming appointment with you and would like to come in to lab first. Please place any orders you would like, thank you. Lab staff.

## 2022-07-11 PROBLEM — N18.31 CHRONIC KIDNEY DISEASE, STAGE 3A (H): Status: ACTIVE | Noted: 2022-07-11

## 2022-07-11 NOTE — PROGRESS NOTES
Please let patient know that I have placed some labs for him to obtain at upcoming lab visit.    Make sure he doesn't eat or drink anything besides water for at least 8 hours prior to the lab visit.    Tl Martinez MD  [facility]

## 2022-07-21 ENCOUNTER — LAB (OUTPATIENT)
Dept: LAB | Facility: CLINIC | Age: 57
End: 2022-07-21
Payer: COMMERCIAL

## 2022-07-21 DIAGNOSIS — N18.31 CHRONIC KIDNEY DISEASE (CKD) STAGE G3A/A1, MODERATELY DECREASED GLOMERULAR FILTRATION RATE (GFR) BETWEEN 45-59 ML/MIN/1.73 SQUARE METER AND ALBUMINURIA CREATININE RATIO LESS THAN 30 MG/G (H): ICD-10-CM

## 2022-07-21 DIAGNOSIS — E78.5 HYPERLIPIDEMIA WITH TARGET LDL LESS THAN 130: ICD-10-CM

## 2022-07-21 DIAGNOSIS — N18.31 CHRONIC KIDNEY DISEASE, STAGE 3A (H): Primary | ICD-10-CM

## 2022-07-21 DIAGNOSIS — N27.0 UNILATERAL SMALL KIDNEY: ICD-10-CM

## 2022-07-21 DIAGNOSIS — E66.09 EXOGENOUS OBESITY: ICD-10-CM

## 2022-07-21 DIAGNOSIS — R73.01 IMPAIRED FASTING GLUCOSE: ICD-10-CM

## 2022-07-21 DIAGNOSIS — Z12.5 SCREENING FOR PROSTATE CANCER: ICD-10-CM

## 2022-07-21 DIAGNOSIS — I10 ESSENTIAL HYPERTENSION, MALIGNANT: ICD-10-CM

## 2022-07-21 DIAGNOSIS — E79.0 URICACIDEMIA: ICD-10-CM

## 2022-07-21 LAB
ALBUMIN SERPL-MCNC: 3.7 G/DL (ref 3.4–5)
ANION GAP SERPL CALCULATED.3IONS-SCNC: 10 MMOL/L (ref 3–14)
BUN SERPL-MCNC: 21 MG/DL (ref 7–30)
CALCIUM SERPL-MCNC: 9 MG/DL (ref 8.5–10.1)
CHLORIDE BLD-SCNC: 105 MMOL/L (ref 94–109)
CHOLEST SERPL-MCNC: 203 MG/DL
CO2 SERPL-SCNC: 23 MMOL/L (ref 20–32)
CREAT SERPL-MCNC: 1.36 MG/DL (ref 0.66–1.25)
FASTING STATUS PATIENT QL REPORTED: YES
GFR SERPL CREATININE-BSD FRML MDRD: 61 ML/MIN/1.73M2
GLUCOSE BLD-MCNC: 105 MG/DL (ref 70–99)
HBA1C MFR BLD: 5.8 % (ref 0–5.6)
HDLC SERPL-MCNC: 37 MG/DL
HGB BLD-MCNC: 13.3 G/DL (ref 13.3–17.7)
LDLC SERPL CALC-MCNC: 144 MG/DL
NONHDLC SERPL-MCNC: 166 MG/DL
PHOSPHATE SERPL-MCNC: 2.8 MG/DL (ref 2.5–4.5)
POTASSIUM BLD-SCNC: 4 MMOL/L (ref 3.4–5.3)
PSA SERPL-MCNC: 0.7 UG/L (ref 0–4)
SODIUM SERPL-SCNC: 138 MMOL/L (ref 133–144)
TRIGL SERPL-MCNC: 109 MG/DL

## 2022-07-21 PROCEDURE — 36415 COLL VENOUS BLD VENIPUNCTURE: CPT

## 2022-07-21 PROCEDURE — 80069 RENAL FUNCTION PANEL: CPT

## 2022-07-21 PROCEDURE — 82043 UR ALBUMIN QUANTITATIVE: CPT

## 2022-07-21 PROCEDURE — 85018 HEMOGLOBIN: CPT

## 2022-07-21 PROCEDURE — G0103 PSA SCREENING: HCPCS

## 2022-07-21 PROCEDURE — 80061 LIPID PANEL: CPT

## 2022-07-21 PROCEDURE — 83036 HEMOGLOBIN GLYCOSYLATED A1C: CPT

## 2022-07-22 LAB
CREAT UR-MCNC: 78 MG/DL
MICROALBUMIN UR-MCNC: 7 MG/L
MICROALBUMIN/CREAT UR: 8.97 MG/G CR (ref 0–17)

## 2022-07-25 PROBLEM — M10.9 GOUT: Status: ACTIVE | Noted: 2022-07-25

## 2022-07-25 PROBLEM — J45.40 MODERATE PERSISTENT ASTHMA WITHOUT COMPLICATION: Status: ACTIVE | Noted: 2022-07-25

## 2022-07-25 PROBLEM — G47.33 OSA (OBSTRUCTIVE SLEEP APNEA): Status: ACTIVE | Noted: 2022-07-25

## 2022-08-25 ENCOUNTER — OFFICE VISIT (OUTPATIENT)
Dept: FAMILY MEDICINE | Facility: CLINIC | Age: 57
End: 2022-08-25
Payer: COMMERCIAL

## 2022-08-25 VITALS
DIASTOLIC BLOOD PRESSURE: 80 MMHG | HEART RATE: 62 BPM | RESPIRATION RATE: 16 BRPM | SYSTOLIC BLOOD PRESSURE: 118 MMHG | WEIGHT: 271 LBS | OXYGEN SATURATION: 97 % | HEIGHT: 72 IN | BODY MASS INDEX: 36.7 KG/M2 | TEMPERATURE: 97.5 F

## 2022-08-25 DIAGNOSIS — J45.40 MODERATE PERSISTENT ASTHMA WITHOUT COMPLICATION: ICD-10-CM

## 2022-08-25 DIAGNOSIS — E66.01 MORBID OBESITY (H): ICD-10-CM

## 2022-08-25 DIAGNOSIS — E78.5 HYPERLIPIDEMIA WITH TARGET LDL LESS THAN 130: ICD-10-CM

## 2022-08-25 DIAGNOSIS — Z00.00 ROUTINE GENERAL MEDICAL EXAMINATION AT A HEALTH CARE FACILITY: Primary | ICD-10-CM

## 2022-08-25 DIAGNOSIS — I10 ESSENTIAL (PRIMARY) HYPERTENSION: ICD-10-CM

## 2022-08-25 DIAGNOSIS — M10.9 GOUT, UNSPECIFIED CAUSE, UNSPECIFIED CHRONICITY, UNSPECIFIED SITE: ICD-10-CM

## 2022-08-25 DIAGNOSIS — R73.03 PREDIABETES: ICD-10-CM

## 2022-08-25 DIAGNOSIS — N18.31 STAGE 3A CHRONIC KIDNEY DISEASE (H): ICD-10-CM

## 2022-08-25 LAB — URATE SERPL-MCNC: 5.4 MG/DL (ref 3.5–7.2)

## 2022-08-25 PROCEDURE — 84550 ASSAY OF BLOOD/URIC ACID: CPT | Performed by: STUDENT IN AN ORGANIZED HEALTH CARE EDUCATION/TRAINING PROGRAM

## 2022-08-25 PROCEDURE — 36415 COLL VENOUS BLD VENIPUNCTURE: CPT | Performed by: STUDENT IN AN ORGANIZED HEALTH CARE EDUCATION/TRAINING PROGRAM

## 2022-08-25 PROCEDURE — 99396 PREV VISIT EST AGE 40-64: CPT | Performed by: STUDENT IN AN ORGANIZED HEALTH CARE EDUCATION/TRAINING PROGRAM

## 2022-08-25 PROCEDURE — 99214 OFFICE O/P EST MOD 30 MIN: CPT | Mod: 25 | Performed by: STUDENT IN AN ORGANIZED HEALTH CARE EDUCATION/TRAINING PROGRAM

## 2022-08-25 RX ORDER — PRAVASTATIN SODIUM 20 MG
20 TABLET ORAL DAILY
Qty: 90 TABLET | Refills: 3 | Status: SHIPPED | OUTPATIENT
Start: 2022-08-25 | End: 2023-08-22

## 2022-08-25 ASSESSMENT — ENCOUNTER SYMPTOMS
ABDOMINAL PAIN: 0
JOINT SWELLING: 0
EYE PAIN: 0
SHORTNESS OF BREATH: 0
MYALGIAS: 0
ARTHRALGIAS: 0
COUGH: 0
NERVOUS/ANXIOUS: 0
HEMATOCHEZIA: 0
HEARTBURN: 0
CONSTIPATION: 0
FEVER: 0
SORE THROAT: 0
FREQUENCY: 0
HEMATURIA: 0
WEAKNESS: 0
DYSURIA: 0
CHILLS: 0
NAUSEA: 0
PARESTHESIAS: 0
DIARRHEA: 0
HEADACHES: 0
DIZZINESS: 0
PALPITATIONS: 0

## 2022-08-25 ASSESSMENT — ASTHMA QUESTIONNAIRES
ACT_TOTALSCORE: 24
QUESTION_5 LAST FOUR WEEKS HOW WOULD YOU RATE YOUR ASTHMA CONTROL: WELL CONTROLLED
QUESTION_2 LAST FOUR WEEKS HOW OFTEN HAVE YOU HAD SHORTNESS OF BREATH: NOT AT ALL
ACT_TOTALSCORE: 24
QUESTION_4 LAST FOUR WEEKS HOW OFTEN HAVE YOU USED YOUR RESCUE INHALER OR NEBULIZER MEDICATION (SUCH AS ALBUTEROL): NOT AT ALL
QUESTION_3 LAST FOUR WEEKS HOW OFTEN DID YOUR ASTHMA SYMPTOMS (WHEEZING, COUGHING, SHORTNESS OF BREATH, CHEST TIGHTNESS OR PAIN) WAKE YOU UP AT NIGHT OR EARLIER THAN USUAL IN THE MORNING: NOT AT ALL
QUESTION_1 LAST FOUR WEEKS HOW MUCH OF THE TIME DID YOUR ASTHMA KEEP YOU FROM GETTING AS MUCH DONE AT WORK, SCHOOL OR AT HOME: NONE OF THE TIME

## 2022-08-25 ASSESSMENT — PAIN SCALES - GENERAL: PAINLEVEL: NO PAIN (0)

## 2022-08-25 NOTE — PROGRESS NOTES
SUBJECTIVE:   CC: Nash Hauser is an 57 year old woman who presents for preventive health visit.     Patient has been advised of split billing requirements and indicates understanding: Yes  Healthy Habits:     Getting at least 3 servings of Calcium per day:  Yes    Bi-annual eye exam:  Yes    Dental care twice a year:  Yes    Sleep apnea or symptoms of sleep apnea:  None    Diet:  Low salt, Low fat/cholesterol and Carbohydrate counting    Frequency of exercise:  2-3 days/week    Duration of exercise:  30-45 minutes    Taking medications regularly:  Yes    Medication side effects:  Muscle aches    PHQ-2 Total Score: 0    Additional concerns today:  No    CKD3a  Follows with nephrology    Morbid obesity  Weight is down, was 15 pounds heavier.  Just eating less, portion control.  280 lb, down to 271.     Asthma  Follows with ENT, may be decreasing Breo-Ellipta    Hasnt taken statins for a long time - legs and arms with cramping  Has taken simvastatin and atorvastatin in the past    Today's PHQ-2 Score:   PHQ-2 ( 1999 Pfizer) 8/25/2022   Q1: Little interest or pleasure in doing things 0   Q2: Feeling down, depressed or hopeless 0   PHQ-2 Score 0   PHQ-2 Total Score (12-17 Years)- Positive if 3 or more points; Administer PHQ-A if positive -   Q1: Little interest or pleasure in doing things Not at all   Q2: Feeling down, depressed or hopeless Not at all   PHQ-2 Score 0     Abuse: Current or Past (Physical, Sexual or Emotional) - No  Do you feel safe in your environment? Yes    Social History     Tobacco Use     Smoking status: Never Smoker     Smokeless tobacco: Never Used   Substance Use Topics     Alcohol use: Yes     Alcohol/week: 3.3 - 5.0 standard drinks     Types: 4 - 6 Standard drinks or equivalent per week     Comment: 7-8 beers per week       Alcohol Use 8/25/2022   Prescreen: >3 drinks/day or >7 drinks/week? Yes   Prescreen: >3 drinks/day or >7 drinks/week? -   AUDIT SCORE  -     Reviewed orders with  "patient.  Reviewed health maintenance and updated orders accordingly - yes    Prostate Cancer:  Obtaining PSA screening today     Reviewed and updated as needed this visit by clinical staff   Tobacco  Allergies  Meds  Problems  Med Hx  Surg Hx  Fam Hx            Reviewed and updated as needed this visit by Provider   Tobacco  Allergies  Meds  Problems                Review of Systems   Constitutional: Negative for chills and fever.   HENT: Negative for congestion, ear pain, hearing loss and sore throat.    Eyes: Negative for pain and visual disturbance.   Respiratory: Negative for cough and shortness of breath.    Cardiovascular: Negative for chest pain, palpitations and peripheral edema.   Gastrointestinal: Negative for abdominal pain, constipation, diarrhea, heartburn, hematochezia and nausea.   Genitourinary: Negative for dysuria, frequency, genital sores, hematuria, impotence, penile discharge and urgency.   Musculoskeletal: Negative for arthralgias, joint swelling and myalgias.   Skin: Negative for rash.   Neurological: Negative for dizziness, weakness, headaches and paresthesias.   Psychiatric/Behavioral: Negative for mood changes. The patient is not nervous/anxious.      OBJECTIVE:   /80 (BP Location: Right arm, Patient Position: Sitting, Cuff Size: Adult Large)   Pulse 62   Temp 97.5  F (36.4  C) (Oral)   Resp 16   Ht 1.816 m (5' 11.5\")   Wt 122.9 kg (271 lb)   SpO2 97%   BMI 37.27 kg/m       Physical Exam     GENERAL: healthy, alert and no distress  HEAD: Normocephalic, atraumatic.   EYES: PERRL. Normal conjunctivae, sclera.   ENT: Normal EAC and TMs bilaterally. Normal nares without congestion. Normal oropharynx.   NECK: Supple. No lymphadenopathy appreciated. Trachea midline. Thyroid not enlarged, not TTP.  RESP: lungs clear to auscultation - no rales, rhonchi or wheezes  CV: regular rate and rhythm, normal S1 S2, no murmur, click, rub or gallop. No carotid bruits. No peripheral " swelling noted.   ABDOMEN: soft, no TTP x4 quadrants. No hepatomegaly or masses appreciated. BS normactive.  MSK: no gross musculoskeletal defects noted.  SKIN: no suspicious lesions or rashes.  EXT: Warm and well perfused. DP pulses 2+ bilaterally.  NEURO: CNII-XII grossly intact. No focal deficits.  PSYCH: Groomed, dressed appropriately for weather. Normal mood with consistent affect.     ASSESSMENT/PLAN:   (Z00.00) Routine general medical examination at a health care facility  (primary encounter diagnosis)  UTD on colonoscopy. A1c screening placing him in prediabetes range. Discussed lifestyle modification. PSA stable.    (E66.01) Morbid obesity (H)  Recently lost 10+ pounds. Working on improving lifestyle - discussed exercise/dietary changes.    (I10) Essential (primary) hypertension  (M10.9) Gout, unspecified cause, unspecified chronicity, unspecified site  (N18.31) Stage 3a chronic kidney disease (H)  Follows with nephrology. Last renal panel stable. No evidence of microalbuminuria. On losartan and hydrochlorothiazide for HTN, allopurinol for gout prophylaxis. Will add on uric acid level today. Could consider transitioning from hydrochlorothiazide as may induce gout but patient without current issues. Can address in future.  Plan: Uric acid    (E78.5) Hyperlipidemia with target LDL less than 130  Discussed last lipid results. ASCVD score of 8.1. Has been on statins in the past but stopped 2/2 myalgias. Will start pravastatin at a low dose and have patient follow up in 90 days for repeat lipid panel.  Plan: pravastatin (PRAVACHOL) 20 MG tablet, Lipid         panel reflex to direct LDL Fasting    (J45.40) Moderate persistent asthma without complication  ACT of 25 today. Follows with ENT. Likely will decrease maintenance inhaler after ENT discussion per patient.    COUNSELING:  Reviewed preventive health counseling, as reflected in patient instructions    Estimated body mass index is 37.27 kg/m  as calculated  "from the following:    Height as of this encounter: 1.816 m (5' 11.5\").    Weight as of this encounter: 122.9 kg (271 lb).    Weight management plan: Discussed healthy diet and exercise guidelines    He reports that he has never smoked. He has never used smokeless tobacco.    Counseling Resources:  ATP IV Guidelines  Pooled Cohorts Equation Calculator  Breast Cancer Risk Calculator  BRCA-Related Cancer Risk Assessment: FHS-7 Tool  FRAX Risk Assessment  ICSI Preventive Guidelines  Dietary Guidelines for Americans, 2010  USDA's MyPlate  ASA Prophylaxis  Lung CA Screening    Tl Prabhakar MD  [facility]  8/25/2022  "

## 2022-08-25 NOTE — PATIENT INSTRUCTIONS
Please start the pravastatin daily. Can repeat your lipid panel in 90 days to see how it is working for you.    I will send the uric acid results to your kidney doctor.    Looks like you are doing well!    It was nice to meet you and I'll see you in a year, if any concerns arise, can be seen earlier.    Dr. Boothe  Preventive Health Recommendations  Male Ages 50 - 64    Yearly exam:             See your health care provider every year in order to  o   Review health changes.   o   Discuss preventive care.    o   Review your medicines if your doctor has prescribed any.   Have a cholesterol test every 5 years, or more frequently if you are at risk for high cholesterol/heart disease.   Have a diabetes test (fasting glucose) every three years. If you are at risk for diabetes, you should have this test more often.   Have a colonoscopy at age 50, or have a yearly FIT test (stool test). These exams will check for colon cancer.    Talk with your health care provider about whether or not a prostate cancer screening test (PSA) is right for you.  You should be tested each year for STDs (sexually transmitted diseases), if you re at risk.     Shots: Get a flu shot each year. Get a tetanus shot every 10 years.     Nutrition:  Eat at least 5 servings of fruits and vegetables daily.   Eat whole-grain bread, whole-wheat pasta and brown rice instead of white grains and rice.   Get adequate Calcium and Vitamin D.     Lifestyle  Exercise for at least 150 minutes a week (30 minutes a day, 5 days a week). This will help you control your weight and prevent disease.   Limit alcohol to one drink per day.   No smoking.   Wear sunscreen to prevent skin cancer.   See your dentist every six months for an exam and cleaning.   See your eye doctor every 1 to 2 years.

## 2023-01-07 ENCOUNTER — HEALTH MAINTENANCE LETTER (OUTPATIENT)
Age: 58
End: 2023-01-07

## 2023-01-17 ENCOUNTER — LAB (OUTPATIENT)
Dept: LAB | Facility: CLINIC | Age: 58
End: 2023-01-17
Payer: COMMERCIAL

## 2023-01-17 DIAGNOSIS — E78.5 HYPERLIPIDEMIA WITH TARGET LDL LESS THAN 130: ICD-10-CM

## 2023-01-17 LAB
CHOLEST SERPL-MCNC: 152 MG/DL
HDLC SERPL-MCNC: 42 MG/DL
LDLC SERPL CALC-MCNC: 98 MG/DL
NONHDLC SERPL-MCNC: 110 MG/DL
TRIGL SERPL-MCNC: 60 MG/DL

## 2023-01-17 PROCEDURE — 36415 COLL VENOUS BLD VENIPUNCTURE: CPT

## 2023-01-17 PROCEDURE — 80061 LIPID PANEL: CPT

## 2023-04-06 ENCOUNTER — HOSPITAL ENCOUNTER (OUTPATIENT)
Dept: ULTRASOUND IMAGING | Facility: CLINIC | Age: 58
Discharge: HOME | DRG: 419 | End: 2023-04-06
Attending: PHYSICIAN ASSISTANT | Admitting: PHYSICIAN ASSISTANT
Payer: COMMERCIAL

## 2023-04-06 ENCOUNTER — OFFICE VISIT (OUTPATIENT)
Dept: PEDIATRICS | Facility: CLINIC | Age: 58
End: 2023-04-06
Payer: COMMERCIAL

## 2023-04-06 ENCOUNTER — OFFICE VISIT (OUTPATIENT)
Dept: FAMILY MEDICINE | Facility: CLINIC | Age: 58
End: 2023-04-06
Payer: COMMERCIAL

## 2023-04-06 ENCOUNTER — TELEPHONE (OUTPATIENT)
Dept: FAMILY MEDICINE | Facility: CLINIC | Age: 58
End: 2023-04-06

## 2023-04-06 ENCOUNTER — HOSPITAL ENCOUNTER (INPATIENT)
Facility: CLINIC | Age: 58
LOS: 1 days | Discharge: HOME | DRG: 419 | End: 2023-04-07
Attending: EMERGENCY MEDICINE | Admitting: HOSPITALIST
Payer: COMMERCIAL

## 2023-04-06 VITALS
OXYGEN SATURATION: 98 % | RESPIRATION RATE: 9 BRPM | HEIGHT: 72 IN | BODY MASS INDEX: 35.65 KG/M2 | SYSTOLIC BLOOD PRESSURE: 140 MMHG | HEART RATE: 71 BPM | WEIGHT: 263.2 LBS | DIASTOLIC BLOOD PRESSURE: 78 MMHG | TEMPERATURE: 98.3 F

## 2023-04-06 VITALS
BODY MASS INDEX: 35.67 KG/M2 | OXYGEN SATURATION: 98 % | HEART RATE: 74 BPM | DIASTOLIC BLOOD PRESSURE: 90 MMHG | SYSTOLIC BLOOD PRESSURE: 152 MMHG | TEMPERATURE: 99.1 F | WEIGHT: 263 LBS | RESPIRATION RATE: 20 BRPM

## 2023-04-06 DIAGNOSIS — R10.11 RUQ ABDOMINAL PAIN: Primary | ICD-10-CM

## 2023-04-06 DIAGNOSIS — N26.1 RENAL ATROPHY, RIGHT: ICD-10-CM

## 2023-04-06 DIAGNOSIS — R11.11 VOMITING WITHOUT NAUSEA, UNSPECIFIED VOMITING TYPE: ICD-10-CM

## 2023-04-06 DIAGNOSIS — K80.50 BILIARY COLIC: ICD-10-CM

## 2023-04-06 DIAGNOSIS — K82.8 THICKENING OF WALL OF GALLBLADDER: ICD-10-CM

## 2023-04-06 DIAGNOSIS — R11.10 VOMITING, UNSPECIFIED VOMITING TYPE, UNSPECIFIED WHETHER NAUSEA PRESENT: ICD-10-CM

## 2023-04-06 DIAGNOSIS — R10.11 RUQ ABDOMINAL PAIN: ICD-10-CM

## 2023-04-06 DIAGNOSIS — K81.0 ACUTE CHOLECYSTITIS: Primary | ICD-10-CM

## 2023-04-06 LAB
ALBUMIN SERPL BCG-MCNC: 4.3 G/DL (ref 3.5–5.2)
ALP SERPL-CCNC: 105 U/L (ref 40–129)
ALT SERPL W P-5'-P-CCNC: 27 U/L (ref 10–50)
ANION GAP SERPL CALCULATED.3IONS-SCNC: 11 MMOL/L (ref 7–15)
AST SERPL W P-5'-P-CCNC: 27 U/L (ref 10–50)
BASOPHILS # BLD AUTO: 0.1 10E3/UL (ref 0–0.2)
BASOPHILS NFR BLD AUTO: 0 %
BILIRUB SERPL-MCNC: 0.4 MG/DL
BUN SERPL-MCNC: 14.5 MG/DL (ref 6–20)
CALCIUM SERPL-MCNC: 9.7 MG/DL (ref 8.6–10)
CHLORIDE SERPL-SCNC: 101 MMOL/L (ref 98–107)
CREAT SERPL-MCNC: 1.38 MG/DL (ref 0.67–1.17)
DEPRECATED HCO3 PLAS-SCNC: 27 MMOL/L (ref 22–29)
EOSINOPHIL # BLD AUTO: 0.3 10E3/UL (ref 0–0.7)
EOSINOPHIL NFR BLD AUTO: 2 %
ERYTHROCYTE [DISTWIDTH] IN BLOOD BY AUTOMATED COUNT: 13.4 % (ref 10–15)
FLUAV RNA SPEC QL NAA+PROBE: NEGATIVE
FLUBV RNA RESP QL NAA+PROBE: NEGATIVE
GFR SERPL CREATININE-BSD FRML MDRD: 60 ML/MIN/1.73M2
GLUCOSE SERPL-MCNC: 110 MG/DL (ref 70–99)
HCT VFR BLD AUTO: 43.5 % (ref 40–53)
HGB BLD-MCNC: 14.6 G/DL (ref 13.3–17.7)
HOLD SPECIMEN: NORMAL
HOLD SPECIMEN: NORMAL
IMM GRANULOCYTES # BLD: 0 10E3/UL
IMM GRANULOCYTES NFR BLD: 0 %
LIPASE SERPL-CCNC: 23 U/L (ref 13–60)
LYMPHOCYTES # BLD AUTO: 2.1 10E3/UL (ref 0.8–5.3)
LYMPHOCYTES NFR BLD AUTO: 16 %
MCH RBC QN AUTO: 29.7 PG (ref 26.5–33)
MCHC RBC AUTO-ENTMCNC: 33.6 G/DL (ref 31.5–36.5)
MCV RBC AUTO: 89 FL (ref 78–100)
MONOCYTES # BLD AUTO: 0.9 10E3/UL (ref 0–1.3)
MONOCYTES NFR BLD AUTO: 7 %
NEUTROPHILS # BLD AUTO: 10.2 10E3/UL (ref 1.6–8.3)
NEUTROPHILS NFR BLD AUTO: 75 %
PLATELET # BLD AUTO: 276 10E3/UL (ref 150–450)
POTASSIUM SERPL-SCNC: 4.4 MMOL/L (ref 3.4–5.3)
PROT SERPL-MCNC: 7.3 G/DL (ref 6.4–8.3)
RBC # BLD AUTO: 4.91 10E6/UL (ref 4.4–5.9)
RSV RNA SPEC NAA+PROBE: NEGATIVE
SARS-COV-2 RNA RESP QL NAA+PROBE: NEGATIVE
SODIUM SERPL-SCNC: 139 MMOL/L (ref 136–145)
WBC # BLD AUTO: 13.6 10E3/UL (ref 4–11)

## 2023-04-06 PROCEDURE — 99223 1ST HOSP IP/OBS HIGH 75: CPT | Mod: AI | Performed by: HOSPITALIST

## 2023-04-06 PROCEDURE — 120N000001 HC R&B MED SURG/OB

## 2023-04-06 PROCEDURE — 36415 COLL VENOUS BLD VENIPUNCTURE: CPT

## 2023-04-06 PROCEDURE — 258N000003 HC RX IP 258 OP 636: Performed by: EMERGENCY MEDICINE

## 2023-04-06 PROCEDURE — 76705 ECHO EXAM OF ABDOMEN: CPT

## 2023-04-06 PROCEDURE — 80053 COMPREHEN METABOLIC PANEL: CPT

## 2023-04-06 PROCEDURE — 250N000013 HC RX MED GY IP 250 OP 250 PS 637: Performed by: HOSPITALIST

## 2023-04-06 PROCEDURE — 85025 COMPLETE CBC W/AUTO DIFF WBC: CPT

## 2023-04-06 PROCEDURE — 87637 SARSCOV2&INF A&B&RSV AMP PRB: CPT | Performed by: EMERGENCY MEDICINE

## 2023-04-06 PROCEDURE — 99285 EMERGENCY DEPT VISIT HI MDM: CPT | Mod: 25

## 2023-04-06 PROCEDURE — 83690 ASSAY OF LIPASE: CPT

## 2023-04-06 PROCEDURE — 96361 HYDRATE IV INFUSION ADD-ON: CPT

## 2023-04-06 PROCEDURE — 99207 PR INPT ADMISSION FROM CLINIC: CPT

## 2023-04-06 PROCEDURE — 250N000011 HC RX IP 250 OP 636: Performed by: EMERGENCY MEDICINE

## 2023-04-06 PROCEDURE — 99207 PR FIRST ORDER ACUTE REFERRAL: CPT

## 2023-04-06 PROCEDURE — 258N000003 HC RX IP 258 OP 636: Performed by: HOSPITALIST

## 2023-04-06 PROCEDURE — C9803 HOPD COVID-19 SPEC COLLECT: HCPCS

## 2023-04-06 PROCEDURE — 250N000011 HC RX IP 250 OP 636: Performed by: HOSPITALIST

## 2023-04-06 RX ORDER — SODIUM CHLORIDE 9 MG/ML
INJECTION, SOLUTION INTRAVENOUS CONTINUOUS
Status: DISCONTINUED | OUTPATIENT
Start: 2023-04-06 | End: 2023-04-08 | Stop reason: HOSPADM

## 2023-04-06 RX ORDER — PROCHLORPERAZINE MALEATE 10 MG
10 TABLET ORAL EVERY 6 HOURS PRN
Status: DISCONTINUED | OUTPATIENT
Start: 2023-04-06 | End: 2023-04-08 | Stop reason: HOSPADM

## 2023-04-06 RX ORDER — PIPERACILLIN SODIUM, TAZOBACTAM SODIUM 3; .375 G/15ML; G/15ML
3.38 INJECTION, POWDER, LYOPHILIZED, FOR SOLUTION INTRAVENOUS EVERY 6 HOURS
Status: DISCONTINUED | OUTPATIENT
Start: 2023-04-07 | End: 2023-04-07

## 2023-04-06 RX ORDER — PIPERACILLIN SODIUM, TAZOBACTAM SODIUM 4; .5 G/20ML; G/20ML
4.5 INJECTION, POWDER, LYOPHILIZED, FOR SOLUTION INTRAVENOUS ONCE
Status: COMPLETED | OUTPATIENT
Start: 2023-04-06 | End: 2023-04-06

## 2023-04-06 RX ORDER — CETIRIZINE HYDROCHLORIDE 10 MG/1
10 TABLET ORAL DAILY
Status: DISCONTINUED | OUTPATIENT
Start: 2023-04-07 | End: 2023-04-08 | Stop reason: HOSPADM

## 2023-04-06 RX ORDER — CETIRIZINE HYDROCHLORIDE 10 MG/1
10 TABLET ORAL DAILY
COMMUNITY

## 2023-04-06 RX ORDER — ONDANSETRON 4 MG/1
4 TABLET, ORALLY DISINTEGRATING ORAL EVERY 6 HOURS PRN
Status: DISCONTINUED | OUTPATIENT
Start: 2023-04-06 | End: 2023-04-07

## 2023-04-06 RX ORDER — HYDROMORPHONE HYDROCHLORIDE 1 MG/ML
0.3 INJECTION, SOLUTION INTRAMUSCULAR; INTRAVENOUS; SUBCUTANEOUS
Status: DISCONTINUED | OUTPATIENT
Start: 2023-04-06 | End: 2023-04-08 | Stop reason: HOSPADM

## 2023-04-06 RX ORDER — ALBUTEROL SULFATE 90 UG/1
2 AEROSOL, METERED RESPIRATORY (INHALATION) EVERY 4 HOURS PRN
Status: DISCONTINUED | OUTPATIENT
Start: 2023-04-06 | End: 2023-04-08 | Stop reason: HOSPADM

## 2023-04-06 RX ORDER — ONDANSETRON 2 MG/ML
4 INJECTION INTRAMUSCULAR; INTRAVENOUS EVERY 6 HOURS PRN
Status: DISCONTINUED | OUTPATIENT
Start: 2023-04-06 | End: 2023-04-08 | Stop reason: HOSPADM

## 2023-04-06 RX ORDER — LOSARTAN POTASSIUM 100 MG/1
100 TABLET ORAL DAILY
Status: DISCONTINUED | OUTPATIENT
Start: 2023-04-06 | End: 2023-04-08 | Stop reason: HOSPADM

## 2023-04-06 RX ORDER — LIDOCAINE 40 MG/G
CREAM TOPICAL
Status: DISCONTINUED | OUTPATIENT
Start: 2023-04-06 | End: 2023-04-08 | Stop reason: HOSPADM

## 2023-04-06 RX ORDER — FLUTICASONE FUROATE AND VILANTEROL 200; 25 UG/1; UG/1
1 POWDER RESPIRATORY (INHALATION) DAILY
Status: DISCONTINUED | OUTPATIENT
Start: 2023-04-06 | End: 2023-04-08 | Stop reason: HOSPADM

## 2023-04-06 RX ORDER — ONDANSETRON 2 MG/ML
4 INJECTION INTRAMUSCULAR; INTRAVENOUS EVERY 6 HOURS PRN
Status: DISCONTINUED | OUTPATIENT
Start: 2023-04-06 | End: 2023-04-07

## 2023-04-06 RX ORDER — ONDANSETRON 4 MG/1
4 TABLET, ORALLY DISINTEGRATING ORAL EVERY 6 HOURS PRN
Status: DISCONTINUED | OUTPATIENT
Start: 2023-04-06 | End: 2023-04-08 | Stop reason: HOSPADM

## 2023-04-06 RX ORDER — ALLOPURINOL 300 MG/1
300 TABLET ORAL DAILY
Status: DISCONTINUED | OUTPATIENT
Start: 2023-04-06 | End: 2023-04-08 | Stop reason: HOSPADM

## 2023-04-06 RX ORDER — PROCHLORPERAZINE 25 MG
25 SUPPOSITORY, RECTAL RECTAL EVERY 12 HOURS PRN
Status: DISCONTINUED | OUTPATIENT
Start: 2023-04-06 | End: 2023-04-08 | Stop reason: HOSPADM

## 2023-04-06 RX ADMIN — LOSARTAN POTASSIUM 100 MG: 100 TABLET, FILM COATED ORAL at 22:28

## 2023-04-06 RX ADMIN — SODIUM CHLORIDE 1000 ML: 9 INJECTION, SOLUTION INTRAVENOUS at 19:07

## 2023-04-06 RX ADMIN — PIPERACILLIN AND TAZOBACTAM 4.5 G: 4; .5 INJECTION, POWDER, FOR SOLUTION INTRAVENOUS at 20:05

## 2023-04-06 RX ADMIN — HYDROMORPHONE HYDROCHLORIDE 0.3 MG: 1 INJECTION, SOLUTION INTRAMUSCULAR; INTRAVENOUS; SUBCUTANEOUS at 20:35

## 2023-04-06 RX ADMIN — SODIUM CHLORIDE: 9 INJECTION, SOLUTION INTRAVENOUS at 20:34

## 2023-04-06 RX ADMIN — ALLOPURINOL 300 MG: 300 TABLET ORAL at 22:28

## 2023-04-06 ASSESSMENT — ASTHMA QUESTIONNAIRES
QUESTION_4 LAST FOUR WEEKS HOW OFTEN HAVE YOU USED YOUR RESCUE INHALER OR NEBULIZER MEDICATION (SUCH AS ALBUTEROL): NOT AT ALL
QUESTION_1 LAST FOUR WEEKS HOW MUCH OF THE TIME DID YOUR ASTHMA KEEP YOU FROM GETTING AS MUCH DONE AT WORK, SCHOOL OR AT HOME: NONE OF THE TIME
QUESTION_3 LAST FOUR WEEKS HOW OFTEN DID YOUR ASTHMA SYMPTOMS (WHEEZING, COUGHING, SHORTNESS OF BREATH, CHEST TIGHTNESS OR PAIN) WAKE YOU UP AT NIGHT OR EARLIER THAN USUAL IN THE MORNING: NOT AT ALL
ACT_TOTALSCORE: 24
QUESTION_5 LAST FOUR WEEKS HOW WOULD YOU RATE YOUR ASTHMA CONTROL: WELL CONTROLLED
QUESTION_2 LAST FOUR WEEKS HOW OFTEN HAVE YOU HAD SHORTNESS OF BREATH: NOT AT ALL
ACT_TOTALSCORE: 24

## 2023-04-06 ASSESSMENT — ACTIVITIES OF DAILY LIVING (ADL)
ADLS_ACUITY_SCORE: 35
ADLS_ACUITY_SCORE: 35

## 2023-04-06 ASSESSMENT — PAIN SCALES - GENERAL: PAINLEVEL: EXTREME PAIN (8)

## 2023-04-06 NOTE — PROGRESS NOTES
Assessment/Plan:    Pt with mild leukocytosis (WBC count 13.6), labs otherwise unremarkable including lipase.   Ultrasound shows gallbladder wall thickening and cholelithiasis. Per radiology- equivocal for cholecystitis and recommend HIDA scan. Tried several times to page general surgery but did not hear back so advised pt go to the ER at [facility] for HIDA scan and general surgery consult. He will go by private vehicle. Advised to remain NPO.    Pt has atrophy of R kidney, noted on ultrasound with mild dilatation of right renal pelvis. Renal function stable on labs.    See patient instructions below.    At the end of the encounter, I discussed results, diagnosis, medications. Discussed red flags for immediate return to clinic/ER, as well as indications for follow up if no improvement. Patient understood and agreed to plan. Patient was stable for discharge.      ICD-10-CM    1. RUQ abdominal pain  R10.11 US Abdomen Limited     CBC with platelets differential     Lipase     Comprehensive metabolic panel     CBC with platelets differential     Lipase     Comprehensive metabolic panel     DISCONTINUED: lidocaine (viscous) (XYLOCAINE) 2 % 15 mL, alum & mag hydroxide-simethicone (MAALOX) 15 mL GI Cocktail      2. Vomiting without nausea, unspecified vomiting type  R11.11       3. Thickening of wall of gallbladder  K82.8       4. Renal atrophy, right  N26.1             No follow-ups on file.    BRIDGET Tucker, PAJOSE  [facility]  ---------------------------------------------------------------------------------------------------------------------------------------------------    HPI:  Nash Hauser is a 58 year old male who presents for evaluation of the following:     Abdominal/Flank Pain  Onset/Duration: started 6 days ago intermittently, worse since last night  Description:   Character: Sharp and Gnawing  Location: right upper quadrant  Radiation:  None  Intensity: 7/10  Progression of Symptoms:  waxing and waning  Accompanying Signs & Symptoms:  Fever/Chills: no  Gas/Bloating: YES  Nausea: no  Vomiting: YES- 2 times today  Diarrhea: no   Constipation: YES- smaller BMs than usual in last week or so. Normal BMs last 2 days, no BM today  Dysuria or Hematuria: no   History:   Trauma: no   Previous similar pain: YES- few times but not as severe or prolonged  Previous tests done: no   Previous Abdominal Surgery: no   Precipitating factors:   Does the pain change with:     Food: no     Bowel Movement: no     Urination: no    Other factors:  YES- laying on right side makes it worse  Therapies tried and outcome: Tums- no change; Pepto Bismol helped slightly  When did you eat last: yesterday     Also feeling bloated/gassy, frequent belching  No history of abdominal surgery. Has atrophic R kidney, does not take NSAIDs  No urinary symptoms  Does get occasional heartburn  Was on vacation for 1 week, got back 2 days before symptoms started. Ate a lot of seafood and had a few more EtOH drinks than usual, but didn't have a lot of fatty foods that he can recall  6 days ago had pain in evening, went away the next day and was good for 3 days. Then had jambalaya and pain started again for 1.5 days, then resolved again until last night and has been more severe since then. Was a sharp pain last night/earlier today, is now more of a dull pain  2 episodes of NBNB emesis today but not feeling too nauseous; tolerating PO intake and has been drinking fluids    Past Medical History:   Diagnosis Date     Hypertension      MARGARITO (obstructive sleep apnea) 7/25/2022     Other and unspecified hyperlipidemia      Other genital herpes     Uses oral Valtrex and topical acyclovir when gets flareup     Renal disease     Only one functioning kidney     Unilateral small kidney     Atrophic rt kidney       Vitals:    04/06/23 1518   BP: (!) 152/90   BP Location: Right arm   Patient Position: Chair    Cuff Size: Adult Regular   Pulse: 74   Resp: 20   Temp: 99.1  F (37.3  C)   TempSrc: Oral   SpO2: 98%   Weight: 119.3 kg (263 lb)       Physical Exam  Vitals and nursing note reviewed.   Cardiovascular:      Rate and Rhythm: Normal rate.   Pulmonary:      Effort: Pulmonary effort is normal.   Abdominal:      General: Bowel sounds are normal.      Palpations: Abdomen is soft.      Tenderness: There is abdominal tenderness in the right upper quadrant and epigastric area. Positive signs include Evans's sign.   Neurological:      Mental Status: He is alert.         Labs/Imaging:  Results for orders placed or performed in visit on 04/06/23 (from the past 24 hour(s))   CBC with platelets differential    Narrative    The following orders were created for panel order CBC with platelets differential.  Procedure                               Abnormality         Status                     ---------                               -----------         ------                     CBC with platelets and d...[[id number]]  Abnormal            Final result                 Please view results for these tests on the individual orders.   Lipase   Result Value Ref Range    Lipase 23 13 - 60 U/L   Comprehensive metabolic panel   Result Value Ref Range    Sodium 139 136 - 145 mmol/L    Potassium 4.4 3.4 - 5.3 mmol/L    Chloride 101 98 - 107 mmol/L    Carbon Dioxide (CO2) 27 22 - 29 mmol/L    Anion Gap 11 7 - 15 mmol/L    Urea Nitrogen 14.5 6.0 - 20.0 mg/dL    Creatinine 1.38 (H) 0.67 - 1.17 mg/dL    Calcium 9.7 8.6 - 10.0 mg/dL    Glucose 110 (H) 70 - 99 mg/dL    Alkaline Phosphatase 105 40 - 129 U/L    AST 27 10 - 50 U/L    ALT 27 10 - 50 U/L    Protein Total 7.3 6.4 - 8.3 g/dL    Albumin 4.3 3.5 - 5.2 g/dL    Bilirubin Total 0.4 <=1.2 mg/dL    GFR Estimate 60 (L) >60 mL/min/1.73m2   CBC with platelets and differential   Result Value Ref Range    WBC Count 13.6 (H) 4.0 - 11.0 10e3/uL    RBC Count 4.91 4.40 - 5.90 10e6/uL    Hemoglobin 14.6  13.3 - 17.7 g/dL    Hematocrit 43.5 40.0 - 53.0 %    MCV 89 78 - 100 fL    MCH 29.7 26.5 - 33.0 pg    MCHC 33.6 31.5 - 36.5 g/dL    RDW 13.4 10.0 - 15.0 %    Platelet Count 276 150 - 450 10e3/uL    % Neutrophils 75 %    % Lymphocytes 16 %    % Monocytes 7 %    % Eosinophils 2 %    % Basophils 0 %    % Immature Granulocytes 0 %    Absolute Neutrophils 10.2 (H) 1.6 - 8.3 10e3/uL    Absolute Lymphocytes 2.1 0.8 - 5.3 10e3/uL    Absolute Monocytes 0.9 0.0 - 1.3 10e3/uL    Absolute Eosinophils 0.3 0.0 - 0.7 10e3/uL    Absolute Basophils 0.1 0.0 - 0.2 10e3/uL    Absolute Immature Granulocytes 0.0 <=0.4 10e3/uL     No results found for this or any previous visit (from the past 24 hour(s)).        Patient Instructions   Go to the ER at [facility] for further evaluation.

## 2023-04-06 NOTE — PROGRESS NOTES
Assessment & Plan     (R10.11) RUQ abdominal pain  (primary encounter diagnosis)  (R11.10) Vomiting, unspecified vomiting type, unspecified whether nausea present  Comment: suspecting gallbladder involvement, referred to [facility] for urgent imaging. Spoke with [place] Provider Sandi prior to sending patient. Patient agreeable with plan of care and at this point patient will follow up as needed unless acute concerns arise in the meantime.  Plan: Referral to Acute and Diagnostic Services (Day         of diagnostic / First order acute)    Referral to Acute and Diagnostic Services    The [facility] has been contacted at [phone] ([place]) to confirm patient acceptance. The transition to Acute & Diagnostic Services Clinic has been discussed with patient, and he agrees with next level of care.  Patient understands that evaluation/treatment at [facility] typically takes significantly longer than in clinic/urgent care (>2 hours).        Special issues:  None        Referral placed: Yes  Patient has transportation arranged and will travel to the [facility] without delay: Yes  Patient aware not to eat or drink. Yes    The following provider has assessed this patient for intervention at [facility], and directed the patient for referral: DAISHA Wesley CNP, APRN CNP             BMI:   Estimated body mass index is 35.7 kg/m  as calculated from the following:    Height as of this encounter: 1.829 m (6').    Weight as of this encounter: 119.4 kg (263 lb 3.2 oz).     DAISHA Wesley CNP  [facility] AVILA Cao is a 58 year old, presenting for the following health issues:  Abdominal Pain (/)        4/6/2023     2:06 PM   Additional Questions   Roomed by Roselia Alcala     History of Present Illness       Reason for visit:  Stomach pain upper right  Symptom onset:  3-7 days ago  Symptoms include:  Pain gas cannot get comfortable  Symptom intensity:   Severe  Symptom progression:  Staying the same  Had these symptoms before:  Yes  Has tried/received treatment for these symptoms:  No  What makes it worse:  Laying on right side  What makes it better:  Laying down on stomach    He eats 2-3 servings of fruits and vegetables daily.He consumes 0 sweetened beverage(s) daily.He exercises with enough effort to increase his heart rate 10 to 19 minutes per day.  He exercises with enough effort to increase his heart rate 4 days per week.   He is taking medications regularly.         Pain History:  When did you first notice your pain? 6 days   Have you seen anyone else for your pain? No  How has your pain affected your ability to work? 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 Abdominal/Flank Pain  Onset/Duration: 6 days  Description:   Character: Sharp and Stabbing  Location: right upper quadrant  Radiation: None  Intensity: severe  Progression of Symptoms:  worsening  Accompanying Signs & Symptoms:  Fever/Chills: YES  Gas/Bloating: YES  Nausea: YES  Vomitting: YES, earlier this morning.  Diarrhea: No  Constipation: YES  Dysuria or Hematuria: No  History:   Trauma: No  Previous similar pain: YES- unexplained pain 1 yr ago  Previous tests done: none  Precipitating factors:   Does the pain change with:     Food: unknown    Bowel Movement: No    Urination: No   Other factors:  No  Therapies tried and outcome: pepto bismol, omeprezale,     No BM today  Denies fever or body aches, some chills with emesis.    Review of Systems   Constitutional, HEENT, cardiovascular, pulmonary, gi and gu systems are negative, except as otherwise noted.      Objective    BP (!) 140/78 (BP Location: Right arm, Patient Position: Sitting, Cuff Size: Adult Large)   Pulse 71   Temp 98.3  F (36.8  C) (Oral)   Resp (!) 9   Ht 1.829 m (6')   Wt 119.4 kg (263 lb 3.2 oz)   SpO2 98%   BMI 35.70 kg/m    Body mass index is 35.7  kg/m .  Physical Exam  Vitals and nursing note reviewed.   Constitutional:       General: He is not in acute distress.     Appearance: Normal appearance.   Cardiovascular:      Rate and Rhythm: Normal rate and regular rhythm.      Heart sounds: No murmur heard.     No friction rub. No gallop.   Pulmonary:      Effort: Pulmonary effort is normal. No respiratory distress.      Breath sounds: No wheezing, rhonchi or rales.   Abdominal:      General: Bowel sounds are decreased. There is no distension.      Palpations: Abdomen is soft. There is no mass.      Tenderness: There is abdominal tenderness in the right upper quadrant. There is guarding and rebound. There is no right CVA tenderness or left CVA tenderness.      Hernia: No hernia is present.   Skin:     General: Skin is warm and dry.   Neurological:      Mental Status: He is alert.   Psychiatric:         Mood and Affect: Mood normal.         Behavior: Behavior normal. Behavior is cooperative.         Thought Content: Thought content normal.         Judgment: Judgment normal.

## 2023-04-06 NOTE — TELEPHONE ENCOUNTER
Pt had a telehealth visit through wife's employer and they advised pt get an ultrasound on his gallbladder but that they couldn't put the order in and to reach out and request this from pcp. Pt is currently in bed due to pain, vomiting and gas.    Pt call back [phone] Detailed VM Ok    Harmony Starks

## 2023-04-06 NOTE — PROGRESS NOTES
"  {PROVIDER CHARTING PREFERENCE:945545}    Rjaeev Cao is a 58 year old, presenting for the following health issues:  Abdominal Pain (Abdominal pain for a couple of days)        4/6/2023     2:06 PM   Additional Questions   Roomed by Roselia Alcala   Accompanied by Davis / Wife     HPI   Abdominal/Flank Pain  Onset/Duration: few days, got really bad last night   Description:   Character: Sharp and Gnawing  Location: right upper quadrant  Radiation: None  Intensity: 7/10  Progression of Symptoms:  waxing and waning  Accompanying Signs & Symptoms:  Fever/Chills: YES- Vomiting  Gas/Bloating: YES  Nausea: YES  Vomiting: YES  Diarrhea: no   Constipation: YES  Dysuria or Hematuria: no   History:   Trauma: no   Previous similar pain: YES- \" pain when eating burgers\"  Previous tests done: no   Previous Abdominal Surgery: no   Precipitating factors:   Does the pain change with:     Food: no     Bowel Movement: no     Urination: no    Other factors:  YES- lay on right side  Therapies tried and outcome: No pain meds    When did you eat last: yesterday     {additonal problems for provider to add (Optional):703592}      Review of Systems   {ROS COMP (Optional):007620}      Objective    BP (!) 152/90 (BP Location: Right arm, Patient Position: Chair, Cuff Size: Adult Regular)   Pulse 74   Temp 99.1  F (37.3  C) (Oral)   Resp 20   Wt 119.3 kg (263 lb)   SpO2 98%   BMI 35.67 kg/m    Body mass index is 35.67 kg/m .  Physical Exam   {Exam List (Optional):971309}    {Diagnostic Test Results (Optional):914999}    {AMBULATORY ATTESTATION (Optional):116666}            "

## 2023-04-06 NOTE — ED TRIAGE NOTES
Pt was seen recently at urgent care and had ultrasound done and he had issues with his galbladder and he was referred to the ed to read his ultrasound      Triage Assessment     Row Name 04/06/23 4223       Triage Assessment (Adult)    Airway WDL WDL       Respiratory WDL    Respiratory WDL WDL       Skin Circulation/Temperature WDL    Skin Circulation/Temperature WDL WDL       Cardiac WDL    Cardiac WDL WDL       Peripheral/Neurovascular WDL    Peripheral Neurovascular WDL WDL       Cognitive/Neuro/Behavioral WDL    Cognitive/Neuro/Behavioral WDL WDL

## 2023-04-06 NOTE — TELEPHONE ENCOUNTER
Per conversation w/ TD pt needs to be seen today. ADV no appts at [facility]. Acute provider in [facility] with availability. Reached out to RN line for approval, held for over 20 mins with no answer. Scheduled pt. @ 1:40pm.     Harmony Starks

## 2023-04-07 ENCOUNTER — ANESTHESIA EVENT (OUTPATIENT)
Dept: SURGERY | Facility: CLINIC | Age: 58
DRG: 419 | End: 2023-04-07
Payer: COMMERCIAL

## 2023-04-07 ENCOUNTER — ANESTHESIA (OUTPATIENT)
Dept: SURGERY | Facility: CLINIC | Age: 58
DRG: 419 | End: 2023-04-07
Payer: COMMERCIAL

## 2023-04-07 ENCOUNTER — APPOINTMENT (OUTPATIENT)
Dept: NUCLEAR MEDICINE | Facility: CLINIC | Age: 58
DRG: 419 | End: 2023-04-07
Attending: HOSPITALIST
Payer: COMMERCIAL

## 2023-04-07 VITALS
DIASTOLIC BLOOD PRESSURE: 89 MMHG | SYSTOLIC BLOOD PRESSURE: 157 MMHG | OXYGEN SATURATION: 94 % | RESPIRATION RATE: 17 BRPM | HEIGHT: 72 IN | HEART RATE: 58 BPM | WEIGHT: 263 LBS | BODY MASS INDEX: 35.62 KG/M2 | TEMPERATURE: 97.8 F

## 2023-04-07 LAB
ALBUMIN SERPL BCG-MCNC: 3.8 G/DL (ref 3.5–5.2)
ALP SERPL-CCNC: 94 U/L (ref 40–129)
ALT SERPL W P-5'-P-CCNC: 20 U/L (ref 10–50)
ANION GAP SERPL CALCULATED.3IONS-SCNC: 10 MMOL/L (ref 7–15)
AST SERPL W P-5'-P-CCNC: 20 U/L (ref 10–50)
BILIRUB SERPL-MCNC: 0.6 MG/DL
BUN SERPL-MCNC: 13.4 MG/DL (ref 6–20)
CALCIUM SERPL-MCNC: 8.9 MG/DL (ref 8.6–10)
CHLORIDE SERPL-SCNC: 107 MMOL/L (ref 98–107)
CREAT SERPL-MCNC: 1.38 MG/DL (ref 0.67–1.17)
DEPRECATED HCO3 PLAS-SCNC: 24 MMOL/L (ref 22–29)
ERYTHROCYTE [DISTWIDTH] IN BLOOD BY AUTOMATED COUNT: 13.7 % (ref 10–15)
GFR SERPL CREATININE-BSD FRML MDRD: 60 ML/MIN/1.73M2
GLUCOSE SERPL-MCNC: 118 MG/DL (ref 70–99)
HCT VFR BLD AUTO: 40.1 % (ref 40–53)
HGB BLD-MCNC: 13.2 G/DL (ref 13.3–17.7)
MCH RBC QN AUTO: 29.5 PG (ref 26.5–33)
MCHC RBC AUTO-ENTMCNC: 32.9 G/DL (ref 31.5–36.5)
MCV RBC AUTO: 90 FL (ref 78–100)
PLATELET # BLD AUTO: 280 10E3/UL (ref 150–450)
POTASSIUM SERPL-SCNC: 4.2 MMOL/L (ref 3.4–5.3)
PROT SERPL-MCNC: 6.4 G/DL (ref 6.4–8.3)
RBC # BLD AUTO: 4.48 10E6/UL (ref 4.4–5.9)
SODIUM SERPL-SCNC: 141 MMOL/L (ref 136–145)
WBC # BLD AUTO: 9.3 10E3/UL (ref 4–11)

## 2023-04-07 PROCEDURE — 99221 1ST HOSP IP/OBS SF/LOW 40: CPT | Mod: 57 | Performed by: PHYSICIAN ASSISTANT

## 2023-04-07 PROCEDURE — 370N000017 HC ANESTHESIA TECHNICAL FEE, PER MIN: Performed by: SURGERY

## 2023-04-07 PROCEDURE — 360N000076 HC SURGERY LEVEL 3, PER MIN: Performed by: SURGERY

## 2023-04-07 PROCEDURE — 88304 TISSUE EXAM BY PATHOLOGIST: CPT | Mod: TC | Performed by: SURGERY

## 2023-04-07 PROCEDURE — 250N000009 HC RX 250: Performed by: SURGERY

## 2023-04-07 PROCEDURE — 258N000003 HC RX IP 258 OP 636: Performed by: NURSE ANESTHETIST, CERTIFIED REGISTERED

## 2023-04-07 PROCEDURE — 47562 LAPAROSCOPIC CHOLECYSTECTOMY: CPT | Performed by: PHYSICIAN ASSISTANT

## 2023-04-07 PROCEDURE — 250N000013 HC RX MED GY IP 250 OP 250 PS 637: Performed by: STUDENT IN AN ORGANIZED HEALTH CARE EDUCATION/TRAINING PROGRAM

## 2023-04-07 PROCEDURE — 250N000011 HC RX IP 250 OP 636: Performed by: NURSE ANESTHETIST, CERTIFIED REGISTERED

## 2023-04-07 PROCEDURE — 250N000013 HC RX MED GY IP 250 OP 250 PS 637: Performed by: INTERNAL MEDICINE

## 2023-04-07 PROCEDURE — 250N000009 HC RX 250: Performed by: NURSE ANESTHETIST, CERTIFIED REGISTERED

## 2023-04-07 PROCEDURE — 250N000025 HC SEVOFLURANE, PER MIN: Performed by: SURGERY

## 2023-04-07 PROCEDURE — 250N000011 HC RX IP 250 OP 636: Performed by: ANESTHESIOLOGY

## 2023-04-07 PROCEDURE — 250N000011 HC RX IP 250 OP 636: Performed by: RADIOLOGY

## 2023-04-07 PROCEDURE — 78226 HEPATOBILIARY SYSTEM IMAGING: CPT

## 2023-04-07 PROCEDURE — 88304 TISSUE EXAM BY PATHOLOGIST: CPT | Mod: 26 | Performed by: PATHOLOGY

## 2023-04-07 PROCEDURE — 999N000154 HC STATISTIC RADIOLOGY XRAY, US, CT, MAR, NM

## 2023-04-07 PROCEDURE — 710N000009 HC RECOVERY PHASE 1, LEVEL 1, PER MIN: Performed by: SURGERY

## 2023-04-07 PROCEDURE — 85027 COMPLETE CBC AUTOMATED: CPT | Performed by: HOSPITALIST

## 2023-04-07 PROCEDURE — 343N000001 HC RX 343: Performed by: HOSPITALIST

## 2023-04-07 PROCEDURE — 250N000011 HC RX IP 250 OP 636: Performed by: SURGERY

## 2023-04-07 PROCEDURE — 99239 HOSP IP/OBS DSCHRG MGMT >30: CPT | Performed by: HOSPITALIST

## 2023-04-07 PROCEDURE — A9537 TC99M MEBROFENIN: HCPCS | Performed by: HOSPITALIST

## 2023-04-07 PROCEDURE — 250N000013 HC RX MED GY IP 250 OP 250 PS 637: Performed by: HOSPITALIST

## 2023-04-07 PROCEDURE — 250N000011 HC RX IP 250 OP 636: Performed by: HOSPITALIST

## 2023-04-07 PROCEDURE — 0FT44ZZ RESECTION OF GALLBLADDER, PERCUTANEOUS ENDOSCOPIC APPROACH: ICD-10-PCS | Performed by: SURGERY

## 2023-04-07 PROCEDURE — 258N000003 HC RX IP 258 OP 636: Performed by: ANESTHESIOLOGY

## 2023-04-07 PROCEDURE — 999N000141 HC STATISTIC PRE-PROCEDURE NURSING ASSESSMENT: Performed by: SURGERY

## 2023-04-07 PROCEDURE — 96374 THER/PROPH/DIAG INJ IV PUSH: CPT

## 2023-04-07 PROCEDURE — 2894A VOIDCORRECT: CPT | Mod: AS | Performed by: PHYSICIAN ASSISTANT

## 2023-04-07 PROCEDURE — 272N000001 HC OR GENERAL SUPPLY STERILE: Performed by: SURGERY

## 2023-04-07 PROCEDURE — 36415 COLL VENOUS BLD VENIPUNCTURE: CPT | Performed by: HOSPITALIST

## 2023-04-07 PROCEDURE — 80053 COMPREHEN METABOLIC PANEL: CPT | Performed by: HOSPITALIST

## 2023-04-07 RX ORDER — FENTANYL CITRATE 50 UG/ML
INJECTION, SOLUTION INTRAMUSCULAR; INTRAVENOUS PRN
Status: DISCONTINUED | OUTPATIENT
Start: 2023-04-07 | End: 2023-04-07

## 2023-04-07 RX ORDER — HYDROMORPHONE HCL IN WATER/PF 6 MG/30 ML
0.4 PATIENT CONTROLLED ANALGESIA SYRINGE INTRAVENOUS EVERY 5 MIN PRN
Status: DISCONTINUED | OUTPATIENT
Start: 2023-04-07 | End: 2023-04-07 | Stop reason: HOSPADM

## 2023-04-07 RX ORDER — HYDROMORPHONE HCL IN WATER/PF 6 MG/30 ML
0.2 PATIENT CONTROLLED ANALGESIA SYRINGE INTRAVENOUS EVERY 5 MIN PRN
Status: DISCONTINUED | OUTPATIENT
Start: 2023-04-07 | End: 2023-04-07 | Stop reason: HOSPADM

## 2023-04-07 RX ORDER — LIDOCAINE HYDROCHLORIDE 20 MG/ML
INJECTION, SOLUTION INFILTRATION; PERINEURAL PRN
Status: DISCONTINUED | OUTPATIENT
Start: 2023-04-07 | End: 2023-04-07

## 2023-04-07 RX ORDER — NALOXONE HYDROCHLORIDE 0.4 MG/ML
0.4 INJECTION, SOLUTION INTRAMUSCULAR; INTRAVENOUS; SUBCUTANEOUS
Status: DISCONTINUED | OUTPATIENT
Start: 2023-04-07 | End: 2023-04-08 | Stop reason: HOSPADM

## 2023-04-07 RX ORDER — DEXAMETHASONE SODIUM PHOSPHATE 4 MG/ML
INJECTION, SOLUTION INTRA-ARTICULAR; INTRALESIONAL; INTRAMUSCULAR; INTRAVENOUS; SOFT TISSUE PRN
Status: DISCONTINUED | OUTPATIENT
Start: 2023-04-07 | End: 2023-04-07

## 2023-04-07 RX ORDER — PROPOFOL 10 MG/ML
INJECTION, EMULSION INTRAVENOUS CONTINUOUS PRN
Status: DISCONTINUED | OUTPATIENT
Start: 2023-04-07 | End: 2023-04-07

## 2023-04-07 RX ORDER — SODIUM CHLORIDE, SODIUM LACTATE, POTASSIUM CHLORIDE, CALCIUM CHLORIDE 600; 310; 30; 20 MG/100ML; MG/100ML; MG/100ML; MG/100ML
INJECTION, SOLUTION INTRAVENOUS CONTINUOUS
Status: DISCONTINUED | OUTPATIENT
Start: 2023-04-07 | End: 2023-04-07 | Stop reason: HOSPADM

## 2023-04-07 RX ORDER — ONDANSETRON 2 MG/ML
4 INJECTION INTRAMUSCULAR; INTRAVENOUS EVERY 30 MIN PRN
Status: DISCONTINUED | OUTPATIENT
Start: 2023-04-07 | End: 2023-04-07 | Stop reason: HOSPADM

## 2023-04-07 RX ORDER — FENTANYL CITRATE 0.05 MG/ML
25 INJECTION, SOLUTION INTRAMUSCULAR; INTRAVENOUS EVERY 5 MIN PRN
Status: DISCONTINUED | OUTPATIENT
Start: 2023-04-07 | End: 2023-04-07 | Stop reason: HOSPADM

## 2023-04-07 RX ORDER — SODIUM CHLORIDE, SODIUM LACTATE, POTASSIUM CHLORIDE, CALCIUM CHLORIDE 600; 310; 30; 20 MG/100ML; MG/100ML; MG/100ML; MG/100ML
INJECTION, SOLUTION INTRAVENOUS CONTINUOUS PRN
Status: DISCONTINUED | OUTPATIENT
Start: 2023-04-07 | End: 2023-04-07

## 2023-04-07 RX ORDER — MORPHINE SULFATE 2 MG/ML
2 INJECTION, SOLUTION INTRAMUSCULAR; INTRAVENOUS ONCE
Status: COMPLETED | OUTPATIENT
Start: 2023-04-07 | End: 2023-04-07

## 2023-04-07 RX ORDER — OXYCODONE HYDROCHLORIDE 5 MG/1
5 TABLET ORAL EVERY 6 HOURS PRN
Qty: 12 TABLET | Refills: 0 | Status: SHIPPED | OUTPATIENT
Start: 2023-04-07 | End: 2023-04-10

## 2023-04-07 RX ORDER — ACETAMINOPHEN 325 MG/1
325-650 TABLET ORAL EVERY 6 HOURS PRN
Qty: 30 TABLET | Refills: 0 | Status: SHIPPED | OUTPATIENT
Start: 2023-04-07

## 2023-04-07 RX ORDER — ONDANSETRON 4 MG/1
4 TABLET, ORALLY DISINTEGRATING ORAL EVERY 30 MIN PRN
Status: DISCONTINUED | OUTPATIENT
Start: 2023-04-07 | End: 2023-04-07 | Stop reason: HOSPADM

## 2023-04-07 RX ORDER — LANOLIN ALCOHOL/MO/W.PET/CERES
3 CREAM (GRAM) TOPICAL
Status: DISCONTINUED | OUTPATIENT
Start: 2023-04-07 | End: 2023-04-08 | Stop reason: HOSPADM

## 2023-04-07 RX ORDER — AMOXICILLIN 250 MG
1 CAPSULE ORAL DAILY
Qty: 14 TABLET | Refills: 0 | Status: SHIPPED | OUTPATIENT
Start: 2023-04-07 | End: 2023-04-21

## 2023-04-07 RX ORDER — NALOXONE HYDROCHLORIDE 0.4 MG/ML
0.2 INJECTION, SOLUTION INTRAMUSCULAR; INTRAVENOUS; SUBCUTANEOUS
Status: DISCONTINUED | OUTPATIENT
Start: 2023-04-07 | End: 2023-04-08 | Stop reason: HOSPADM

## 2023-04-07 RX ORDER — NEOSTIGMINE METHYLSULFATE 1 MG/ML
VIAL (ML) INJECTION PRN
Status: DISCONTINUED | OUTPATIENT
Start: 2023-04-07 | End: 2023-04-07

## 2023-04-07 RX ORDER — KIT FOR THE PREPARATION OF TECHNETIUM TC 99M MEBROFENIN 45 MG/10ML
6 INJECTION, POWDER, LYOPHILIZED, FOR SOLUTION INTRAVENOUS ONCE
Status: COMPLETED | OUTPATIENT
Start: 2023-04-07 | End: 2023-04-07

## 2023-04-07 RX ORDER — PROPOFOL 10 MG/ML
INJECTION, EMULSION INTRAVENOUS PRN
Status: DISCONTINUED | OUTPATIENT
Start: 2023-04-07 | End: 2023-04-07

## 2023-04-07 RX ORDER — FENTANYL CITRATE 0.05 MG/ML
50 INJECTION, SOLUTION INTRAMUSCULAR; INTRAVENOUS EVERY 5 MIN PRN
Status: DISCONTINUED | OUTPATIENT
Start: 2023-04-07 | End: 2023-04-07 | Stop reason: HOSPADM

## 2023-04-07 RX ORDER — ONDANSETRON 2 MG/ML
INJECTION INTRAMUSCULAR; INTRAVENOUS PRN
Status: DISCONTINUED | OUTPATIENT
Start: 2023-04-07 | End: 2023-04-07

## 2023-04-07 RX ORDER — GLYCOPYRROLATE 0.2 MG/ML
INJECTION, SOLUTION INTRAMUSCULAR; INTRAVENOUS PRN
Status: DISCONTINUED | OUTPATIENT
Start: 2023-04-07 | End: 2023-04-07

## 2023-04-07 RX ORDER — OXYCODONE HYDROCHLORIDE 5 MG/1
5 TABLET ORAL EVERY 4 HOURS PRN
Status: DISCONTINUED | OUTPATIENT
Start: 2023-04-07 | End: 2023-04-08 | Stop reason: HOSPADM

## 2023-04-07 RX ORDER — BUPIVACAINE HYDROCHLORIDE AND EPINEPHRINE 2.5; 5 MG/ML; UG/ML
INJECTION, SOLUTION INFILTRATION; PERINEURAL PRN
Status: DISCONTINUED | OUTPATIENT
Start: 2023-04-07 | End: 2023-04-07 | Stop reason: HOSPADM

## 2023-04-07 RX ORDER — CEFAZOLIN SODIUM/WATER 2 G/20 ML
2 SYRINGE (ML) INTRAVENOUS
Status: COMPLETED | OUTPATIENT
Start: 2023-04-07 | End: 2023-04-07

## 2023-04-07 RX ADMIN — PROPOFOL 200 MG: 10 INJECTION, EMULSION INTRAVENOUS at 13:17

## 2023-04-07 RX ADMIN — SODIUM CHLORIDE, POTASSIUM CHLORIDE, SODIUM LACTATE AND CALCIUM CHLORIDE: 600; 310; 30; 20 INJECTION, SOLUTION INTRAVENOUS at 13:09

## 2023-04-07 RX ADMIN — DEXAMETHASONE SODIUM PHOSPHATE 4 MG: 4 INJECTION, SOLUTION INTRA-ARTICULAR; INTRALESIONAL; INTRAMUSCULAR; INTRAVENOUS; SOFT TISSUE at 13:35

## 2023-04-07 RX ADMIN — PROPOFOL 30 MCG/KG/MIN: 10 INJECTION, EMULSION INTRAVENOUS at 13:28

## 2023-04-07 RX ADMIN — MELATONIN TAB 3 MG 3 MG: 3 TAB at 01:20

## 2023-04-07 RX ADMIN — FENTANYL CITRATE 50 MCG: 50 INJECTION, SOLUTION INTRAMUSCULAR; INTRAVENOUS at 13:17

## 2023-04-07 RX ADMIN — PIPERACILLIN AND TAZOBACTAM 3.38 G: 3; .375 INJECTION, POWDER, FOR SOLUTION INTRAVENOUS at 07:59

## 2023-04-07 RX ADMIN — HYDROMORPHONE HYDROCHLORIDE 0.5 MG: 1 INJECTION, SOLUTION INTRAMUSCULAR; INTRAVENOUS; SUBCUTANEOUS at 14:00

## 2023-04-07 RX ADMIN — CETIRIZINE HYDROCHLORIDE 10 MG: 10 TABLET, FILM COATED ORAL at 08:02

## 2023-04-07 RX ADMIN — MEBROFENIN 7 MILLICURIE: 45 INJECTION, POWDER, LYOPHILIZED, FOR SOLUTION INTRAVENOUS at 10:45

## 2023-04-07 RX ADMIN — LOSARTAN POTASSIUM 100 MG: 100 TABLET, FILM COATED ORAL at 08:02

## 2023-04-07 RX ADMIN — FENTANYL CITRATE 50 MCG: 50 INJECTION, SOLUTION INTRAMUSCULAR; INTRAVENOUS at 13:15

## 2023-04-07 RX ADMIN — Medication 2 G: at 13:16

## 2023-04-07 RX ADMIN — SODIUM CHLORIDE, POTASSIUM CHLORIDE, SODIUM LACTATE AND CALCIUM CHLORIDE: 600; 310; 30; 20 INJECTION, SOLUTION INTRAVENOUS at 14:38

## 2023-04-07 RX ADMIN — PIPERACILLIN AND TAZOBACTAM 3.38 G: 3; .375 INJECTION, POWDER, FOR SOLUTION INTRAVENOUS at 01:21

## 2023-04-07 RX ADMIN — ALLOPURINOL 300 MG: 300 TABLET ORAL at 08:02

## 2023-04-07 RX ADMIN — ROCURONIUM BROMIDE 10 MG: 50 INJECTION, SOLUTION INTRAVENOUS at 14:39

## 2023-04-07 RX ADMIN — SODIUM CHLORIDE, POTASSIUM CHLORIDE, SODIUM LACTATE AND CALCIUM CHLORIDE: 600; 310; 30; 20 INJECTION, SOLUTION INTRAVENOUS at 13:12

## 2023-04-07 RX ADMIN — ROCURONIUM BROMIDE 10 MG: 50 INJECTION, SOLUTION INTRAVENOUS at 14:17

## 2023-04-07 RX ADMIN — OXYCODONE HYDROCHLORIDE 5 MG: 5 TABLET ORAL at 18:01

## 2023-04-07 RX ADMIN — SUCCINYLCHOLINE CHLORIDE 100 MG: 20 INJECTION, SOLUTION INTRAMUSCULAR; INTRAVENOUS; PARENTERAL at 13:17

## 2023-04-07 RX ADMIN — ROCURONIUM BROMIDE 50 MG: 50 INJECTION, SOLUTION INTRAVENOUS at 13:27

## 2023-04-07 RX ADMIN — FENTANYL CITRATE 25 MCG: 50 INJECTION, SOLUTION INTRAMUSCULAR; INTRAVENOUS at 16:09

## 2023-04-07 RX ADMIN — PHENYLEPHRINE HYDROCHLORIDE 100 MCG: 10 INJECTION INTRAVENOUS at 13:43

## 2023-04-07 RX ADMIN — GLYCOPYRROLATE 0.8 MG: 0.2 INJECTION, SOLUTION INTRAMUSCULAR; INTRAVENOUS at 15:36

## 2023-04-07 RX ADMIN — NEOSTIGMINE METHYLSULFATE 5 MG: 1 INJECTION, SOLUTION INTRAVENOUS at 15:36

## 2023-04-07 RX ADMIN — FENTANYL CITRATE 25 MCG: 50 INJECTION, SOLUTION INTRAMUSCULAR; INTRAVENOUS at 16:18

## 2023-04-07 RX ADMIN — MORPHINE SULFATE 2 MG: 2 INJECTION, SOLUTION INTRAMUSCULAR; INTRAVENOUS at 12:04

## 2023-04-07 RX ADMIN — ROCURONIUM BROMIDE 10 MG: 50 INJECTION, SOLUTION INTRAVENOUS at 14:57

## 2023-04-07 RX ADMIN — FLUTICASONE FUROATE AND VILANTEROL TRIFENATATE 1 PUFF: 200; 25 POWDER RESPIRATORY (INHALATION) at 08:04

## 2023-04-07 RX ADMIN — ONDANSETRON 4 MG: 2 INJECTION INTRAMUSCULAR; INTRAVENOUS at 15:25

## 2023-04-07 RX ADMIN — MIDAZOLAM 2 MG: 1 INJECTION INTRAMUSCULAR; INTRAVENOUS at 13:12

## 2023-04-07 RX ADMIN — LIDOCAINE HYDROCHLORIDE 100 MG: 20 INJECTION, SOLUTION INFILTRATION; PERINEURAL at 13:17

## 2023-04-07 ASSESSMENT — ACTIVITIES OF DAILY LIVING (ADL)
ADLS_ACUITY_SCORE: 20

## 2023-04-07 NOTE — CONSULTS
Pt seen in our clinic about a year ago.   Adm with cholecystitis.  Stable CKD-3a with atrophic R kidney.  In OR for cholecystectomy.    No renal issues.  Please have follow up in my clinic 1 month after discharge.

## 2023-04-07 NOTE — ED PROVIDER NOTES
History     Chief Complaint:  Abdominal Pain       The history is provided by the patient.      Nash Hauser is a 58 year old male with a history of hypertension who presents with right upper quadrant abdominal pain. Patient reports he has been experiencing right upper quadrant abdominal pain for the past 6 days. he says it worsens in the middle of the night. He reports today has been miserable. He reports nausea and vomiting. Patient denies being on blood thinner and diarrhea. He reports he went to urgent care, they did an ultrasound which showed gallbladder wall thickening and cholelithiasis but negative sonographic Evans sign.  He did have a leukocytosis but his LFTs and lipase are normal.  They sent him to the emergency room for further evaluation.  No fevers.    Independent Historian:   The patient and his wife    Review of External Notes: I reviewed the urgent care note from today as well as the ultrasound report and the labs.    ROS:  Review of Systems as in HPI    Allergies:  Atorvastatin  No Known Drug Allergies  Seasonal Allergies     Medications:    Zyloprim  Hydrodiuril   Cozaar   Pravachol  Albuterol    Past Medical History:    Hypertension  Obstructive sleep apnea  Hyperlipidemia  Genital herpes  Renal disease  Unilateral small kidney  Prediabetes  Gout  Chronic kidney disease    Past Surgical History:    Lumbar microdiskectomy   Colonoscopy   Elbow surgery  Tonsillectomy     Family History:    Alzheimer Disease  Breast cancer  CAD  Eye disorder  Fractures  Lung cancer  Respiratory  Hearing loss  Mental illness  COPD  Intellectual disability    Social History:  The patient presents alone.   Arrived by private vehicle.    PCP: Tl Prabhakar     Physical Exam     Patient Vitals for the past 24 hrs:   BP Temp Temp src Pulse Resp SpO2 Height Weight   04/06/23 2100 (!) 144/83 -- -- 64 -- 97 % -- --   04/06/23 2000 (!) 152/80 -- -- 82 -- 98 % -- --   04/06/23 1942 (!) 155/82 -- -- -- 16 98 % -- --    04/06/23 1857 (!) 159/88 98  F (36.7  C) Oral 72 16 98 % 1.829 m (6') 119.3 kg (263 lb)        Physical Exam  Constitutional: Vital signs reviewed as above  General: Alert, pleasant  HEENT: Moist mucous membranes  Eyes: Pupils are equal, round, and reactive to light.   Neck: Normal range of motion  Cardiovascular: normal rate, Regular rhythm and normal heart sounds.  No MRG  Pulmonary/Chest: Effort normal and breath sounds normal. No respiratory distress. Patient has no wheezes. Patient has no rales.   Gastrointestinal: Soft. Positive bowel sounds. No MRG. Mild right upper quadrant tenderness.   Musculoskeletal/Extremities: Full ROM.  Endo: No pitting edema  Neurological: Alert, no focal deficits.  Skin: Skin is warm and dry.   Psychiatric: Pleasant.     Emergency Department Course       Laboratory:  Labs Ordered and Resulted from Time of ED Arrival to Time of ED Departure - No data to display       Emergency Department Course & Assessments:  Interventions:  Medications   HYDROmorphone (PF) (DILAUDID) injection 0.3 mg (0.3 mg Intravenous $Given 4/6/23 2035)   ondansetron (ZOFRAN ODT) ODT tab 4 mg (has no administration in time range)     Or   ondansetron (ZOFRAN) injection 4 mg (has no administration in time range)   prochlorperazine (COMPAZINE) injection 10 mg (has no administration in time range)     Or   prochlorperazine (COMPAZINE) tablet 10 mg (has no administration in time range)     Or   prochlorperazine (COMPAZINE) suppository 25 mg (has no administration in time range)   sodium chloride 0.9% infusion ( Intravenous $New Bag 4/6/23 2034)   allopurinol (ZYLOPRIM) tablet 300 mg (has no administration in time range)   cetirizine (zyrTEC) tablet 10 mg (has no administration in time range)   albuterol (PROVENTIL HFA/VENTOLIN HFA) inhaler (has no administration in time range)   losartan (COZAAR) tablet 100 mg (has no administration in time range)   fluticasone-vilanterol (BREO ELLIPTA) 200-25 MCG/ACT inhaler 1  puff (has no administration in time range)   piperacillin-tazobactam (ZOSYN) 3.375 g vial to attach to  mL bag (has no administration in time range)   0.9% sodium chloride BOLUS (0 mLs Intravenous Stopped 4/6/23 2006)   piperacillin-tazobactam (ZOSYN) 4.5 g vial to attach to  mL bag (0 g Intravenous Stopped 4/6/23 2034)        Assessments:  1901 I obtained history and examined the patient as noted above.   1930 I spoke with Dr. South, Surgeon, regarding the patient's history and presentation in the emergency department today.   2000 I spoke with  of the hospitalist team regarding the patient, who accepted the patient for admission.    Independent Interpretation (X-rays, CTs, rhythm strip):  I reviewed the ultrasound earlier today and it does show gallbladder wall thickening without pericholecystic fluid.    Consultations/Discussion of Management or Tests:  I discussed the case with Dr. South from surgery at the request of the hospitalist.  No plans to operate on him tonight.     Social Determinants of Health affecting care:   None    Disposition:  The patient was admitted to the hospital under the care of Dr. Savage.     Impression & Plan      Medical Decision Making:  Patient presents to the Emergency Department with biliary colic.  Ultrasound was equivocal at urgent care as detailed above.  He does have a leukocytosis but his hepatic panel lipase are negative.  Plans were for advanced imaging tonight.  At the request of the hospitalist service I did contact general surgery who agreed that further imaging in the form of a HIDA scan is reasonable but it is a positive cholecystectomy to be done in the morning.  Patient has been comfortable here with pain medications and fluids.  I do not feel any further labs are necessary.  He is remained stable and to be brought into the hospital service as detailed above.    Diagnosis:    ICD-10-CM    1. Biliary colic  K80.50            Discharge  Medications:  New Prescriptions    No medications on file          Scribe Disclosure:  I, MANPREETMELISSACONNIE, am serving as a scribe at 7:30 PM on 4/6/2023 to document services personally performed by Alvaro Vasquez MD based on my observations and the provider's statements to me.     4/6/2023   Alvaro Vasquez MD Walters, Brent Aaron, MD  04/06/23 2148

## 2023-04-07 NOTE — ANESTHESIA PREPROCEDURE EVALUATION
Anesthesia Pre-Procedure Evaluation    Patient: Nash Hauser   MRN: [id number] : 1965        Procedure : Procedure(s):  LAPAROSCOPIC CHOLECYSTECTOMY          Past Medical History:   Diagnosis Date     Hypertension      MARGARITO (obstructive sleep apnea) 2022     Other and unspecified hyperlipidemia      Other genital herpes     Uses oral Valtrex and topical acyclovir when gets flareup     Renal disease     Only one functioning kidney     Unilateral small kidney     Atrophic rt kidney      Past Surgical History:   Procedure Laterality Date     BACK SURGERY      Lumbar microdiskectomy     COLONOSCOPY N/A 2017    repeat 10 years.Procedure: COLONOSCOPY;  COLONOSCOPY;  Surgeon: Oral Goodman MD;  Location:  GI     ELBOW SURGERY      R Elbow surgery; incl resection of portion of radial head     EXCISE EXOSTOSIS TOE(S)  2013    Procedure: EXCISE EXOSTOSIS TOE(S);  Left Third Toe Bone Spur removal ;  Surgeon: Stephani Ramirez DPM, Podiatr;  Location:  OR     SURGICAL HISTORY OF -       LASIK right eye; did not work as has some kind of basement membrane disorder.     ZZ NONSPECIFIC PROCEDURE      tonsillectomy     ZZC NONSPECIFIC PROCEDURE      Another diskectomy      Allergies   Allergen Reactions     Atorvastatin      Other reaction(s): myalgia     No Known Drug Allergies      Seasonal Allergies       Social History     Tobacco Use     Smoking status: Never     Smokeless tobacco: Never   Vaping Use     Vaping status: Never Used   Substance Use Topics     Alcohol use: Yes     Alcohol/week: 3.3 - 5.0 standard drinks of alcohol     Types: 4 - 6 Standard drinks or equivalent per week     Comment: 7-8 beers per week      Wt Readings from Last 1 Encounters:   23 119.3 kg (263 lb)        Anesthesia Evaluation            ROS/MED HX  ENT/Pulmonary:     (+) sleep apnea, Intermittent, asthma     Neurologic:  - neg neurologic ROS     Cardiovascular:     (+) Dyslipidemia  hypertension-----    METS/Exercise Tolerance:     Hematologic:  - neg hematologic  ROS     Musculoskeletal: Comment: Gout      GI/Hepatic:     (+) cholecystitis/cholelithiasis,     Renal/Genitourinary: Comment: H/o right renal cortical atrophy    (+) renal disease, type: CRI,     Endo:     (+) Obesity,     Psychiatric/Substance Use:       Infectious Disease:       Malignancy:       Other:               OUTSIDE LABS:  CBC:   Lab Results   Component Value Date    WBC 9.3 04/07/2023    WBC 13.6 (H) 04/06/2023    HGB 13.2 (L) 04/07/2023    HGB 14.6 04/06/2023    HCT 40.1 04/07/2023    HCT 43.5 04/06/2023     04/07/2023     04/06/2023     BMP:   Lab Results   Component Value Date     04/07/2023     04/06/2023    POTASSIUM 4.2 04/07/2023    POTASSIUM 4.4 04/06/2023    CHLORIDE 107 04/07/2023    CHLORIDE 101 04/06/2023    CO2 24 04/07/2023    CO2 27 04/06/2023    BUN 13.4 04/07/2023    BUN 14.5 04/06/2023    CR 1.38 (H) 04/07/2023    CR 1.38 (H) 04/06/2023     (H) 04/07/2023     (H) 04/06/2023     COAGS: No results found for: PTT, INR, FIBR  POC: No results found for: BGM, HCG, HCGS  HEPATIC:   Lab Results   Component Value Date    ALBUMIN 3.8 04/07/2023    PROTTOTAL 6.4 04/07/2023    ALT 20 04/07/2023    AST 20 04/07/2023    ALKPHOS 94 04/07/2023    BILITOTAL 0.6 04/07/2023     OTHER:   Lab Results   Component Value Date    A1C 5.8 (H) 07/21/2022    MICHEAL 8.9 04/07/2023    PHOS 2.8 07/21/2022    LIPASE 23 04/06/2023       Anesthesia Plan    ASA Status:  3      Anesthesia Type: General.     - Airway: ETT   Induction: Intravenous, Propofol, RSI.   Maintenance: Balanced.        Consents            Postoperative Care    Pain management: IV analgesics.   PONV prophylaxis: Ondansetron (or other 5HT-3), Dexamethasone or Solumedrol, Background Propofol Infusion     Comments:                Ciro Cotto DO, DO

## 2023-04-07 NOTE — PHARMACY-ADMISSION MEDICATION HISTORY
Pharmacy Medication History  Admission medication history interview status for the 4/6/2023  admission is complete. See EPIC admission navigator for prior to admission medications     Location of Interview: Patient room  Medication history sources: Patient and Surescripts    Significant changes made to the medication list:      In the past week, patient estimated taking medication this percent of the time: greater than 90%    Medication Affordability:  Not including over the counter (OTC) medications, was there a time in the past 12 months when you did not take your medications as prescribed because of cost?: No    Additional medication history information:   ---  Pt hasn't taken any medications today due to N/V.    Medication reconciliation completed by provider prior to medication history? No    Time spent in this activity: 15 minutes    Prior to Admission medications    Medication Sig Last Dose Taking? Auth Provider Long Term End Date   allopurinol (ZYLOPRIM) 300 MG tablet Take 300 mg by mouth daily 4/5/2023 at am Yes Reported, Patient     BREO ELLIPTA 200-25 MCG/INH Inhaler Inhale 1 puff into the lungs daily 4/5/2023 at am Yes Reported, Patient No    cetirizine (ZYRTEC) 10 MG tablet Take 10 mg by mouth daily 4/5/2023 at am Yes Unknown, Entered By History     hydrochlorothiazide (HYDRODIURIL) 25 MG tablet TAKE 1 TABLET BY MOUTH EVERY DAY 4/5/2023 at am Yes Nguyen Murray MD Yes    losartan (COZAAR) 100 MG tablet TAKE 1 TABLET BY MOUTH EVERY DAY 4/5/2023 at am Yes Nguyen Murray MD Yes    pravastatin (PRAVACHOL) 20 MG tablet Take 1 tablet (20 mg) by mouth daily 4/5/2023 at am Yes Tl Prabhakar MD Yes    VENTOLIN  (90 Base) MCG/ACT inhaler INHALE 2 PUFFS INTO THE LUNGS EVERY 4 HOURS AS NEEDED FOR SHORTNESS OF BREATH / DYSPNEA OR WHEEZING prn Yes Nguyen Murray MD Yes        The information provided in this note is only as accurate as the sources available at the time of update(s)

## 2023-04-07 NOTE — INTERVAL H&P NOTE
I have reviewed the surgical (or preoperative) H&P that is linked to this encounter, and examined the patient. There are no significant changes    Clinical Conditions Present on Arrival:  Clinically Significant Risk Factors Present on Admission                    # Obesity: Estimated body mass index is 35.67 kg/m  as calculated from the following:    Height as of this encounter: 1.829 m (6').    Weight as of this encounter: 119.3 kg (263 lb).

## 2023-04-07 NOTE — ANESTHESIA POSTPROCEDURE EVALUATION
Patient: Nash Hauser    Procedure: Procedure(s):  LAPAROSCOPIC CHOLECYSTECTOMY       Anesthesia Type:  General    Note:  Disposition: Inpatient   Postop Pain Control: Uneventful            Sign Out: Well controlled pain   PONV: No   Neuro/Psych: Uneventful            Sign Out: Acceptable/Baseline neuro status   Airway/Respiratory: Uneventful            Sign Out: Acceptable/Baseline resp. status   CV/Hemodynamics: Uneventful            Sign Out: Acceptable CV status; No obvious hypovolemia; No obvious fluid overload   Other NRE: NONE   DID A NON-ROUTINE EVENT OCCUR? No           Last vitals:  Vitals Value Taken Time   /75 04/07/23 1630   Temp     Pulse 60 04/07/23 1639   Resp 16 04/07/23 1639   SpO2 95 % 04/07/23 1639   Vitals shown include unvalidated device data.    Electronically Signed By: Fabio Joshua MD  April 7, 2023  4:40 PM

## 2023-04-07 NOTE — CONSULTS
General Surgery Consultation    Nash Hauser MRN# [id number]   Age: 58 year old YOB: 1965     Date of Admission:  4/6/2023    Reason for consult:            Abdominal pain, right upper quadrant       Requesting physician:            Dr. Gina Savage                  Chief Complaint:   Abdominal pain, right upper quadrant     History is obtained from the patient and EMR         History of Present Illness:     Joseph Hauser is a 58-year-old man who presented to the [facility] emergency department yesterday with right upper quadrant abdominal pain.  His past medical history is significant for atrophic kidney, hypertension, degenerative disc disease, sleep apnea.  He reports an episode of abdominal pain following eating popcorn last Friday.  This resolved over the weekend and he was asymptomatic.  Earlier this week on Tuesday he had a late meal and had unrelenting right upper quadrant abdominal pain.  This did improve overnight but returned Wednesday.  His abdominal pain was associated with nausea and vomiting.  He sought care at an urgent care early Thursday and he was referred to the emergency department.  The following investigation revealed normal bilirubin and liver function test.  Lipase was normal.  WBC 13.6.  Kidney function was at his baseline with creatinine at 1.38 and GFR of 60.   An ultrasound was obtained which demonstrates gallbladder wall of 6 mm with the presence of multiple gallstones.  No pericholecystic fluid was observed.  A HIDA scan is pending.    Mr. Hauser recalls similar episodes occurring sporadically over the past 2 years which were thought to be gastroenteritis or food poisoning at the time.  The last time he ate was Wednesday.  He had a normal bowel movement yesterday.  He does not take any blood thinners.  He has not had previous abdominal surgery.  He has tolerated multiple back surgeries without difficulty of anesthesia in the past.          Past Medical History:     has a past medical history of Hypertension, MARGARITO (obstructive sleep apnea) (7/25/2022), Other and unspecified hyperlipidemia, Other genital herpes, Renal disease, and Unilateral small kidney.          Past Surgical History:     Past Surgical History:   Procedure Laterality Date     BACK SURGERY  2002    Lumbar microdiskectomy     COLONOSCOPY N/A 11/8/2017    repeat 10 years.Procedure: COLONOSCOPY;  COLONOSCOPY;  Surgeon: Oral Goodman MD;  Location:  GI     ELBOW SURGERY  2002    R Elbow surgery; incl resection of portion of radial head     EXCISE EXOSTOSIS TOE(S)  9/23/2013    Procedure: EXCISE EXOSTOSIS TOE(S);  Left Third Toe Bone Spur removal ;  Surgeon: Stephani Ramirez DPM, Podiatr;  Location: [facility] OR     SURGICAL HISTORY OF -       LASIK right eye; did not work as has some kind of basement membrane disorder.     [facility] NONSPECIFIC PROCEDURE  1992    tonsillectomy     [facility] NONSPECIFIC PROCEDURE  2004    Another diskectomy               Social History:     Social History     Tobacco Use     Smoking status: Never     Smokeless tobacco: Never   Vaping Use     Vaping status: Never Used   Substance Use Topics     Alcohol use: Yes     Alcohol/week: 3.3 - 5.0 standard drinks of alcohol     Types: 4 - 6 Standard drinks or equivalent per week     Comment: 7-8 beers per week             Family History:   No family history of gallbladder disease  Father had a multiple vessel cardiac bypass in his 70s  Mother was a smoker with COPD and lung cancer  Both parents lived to late 80s.         Allergies:     Allergies   Allergen Reactions     Atorvastatin      Other reaction(s): myalgia     No Known Drug Allergies      Seasonal Allergies              Medications:   No current facility-administered medications on file prior to encounter.  allopurinol (ZYLOPRIM) 300 MG tablet, Take 300 mg by mouth daily  BREO ELLIPTA 200-25 MCG/INH Inhaler, Inhale 1 puff into the lungs daily  cetirizine (ZYRTEC) 10 MG tablet, Take 10 mg by mouth  daily  hydrochlorothiazide (HYDRODIURIL) 25 MG tablet, TAKE 1 TABLET BY MOUTH EVERY DAY  losartan (COZAAR) 100 MG tablet, TAKE 1 TABLET BY MOUTH EVERY DAY  pravastatin (PRAVACHOL) 20 MG tablet, Take 1 tablet (20 mg) by mouth daily  VENTOLIN  (90 Base) MCG/ACT inhaler, INHALE 2 PUFFS INTO THE LUNGS EVERY 4 HOURS AS NEEDED FOR SHORTNESS OF BREATH / DYSPNEA OR WHEEZING        allopurinol  300 mg Oral Daily     cetirizine  10 mg Oral Daily     fluticasone-vilanterol  1 puff Inhalation Daily     losartan  100 mg Oral Daily     piperacillin-tazobactam  3.375 g Intravenous Q6H     sodium chloride (PF)  3 mL Intracatheter Q8H            Review of Systems:   Review Of Systems  Skin: negative  Eyes: negative  Ears/Nose/Throat: negative  Respiratory:   Cardiovascular: negative  Gastrointestinal: POS for RUQ pain, N, V  Genitourinary: negative  Musculoskeletal: Spine injection 2 months ago  Neurologic: negative  Psychiatric: negative  Hematologic/Lymphatic/Immunologic: negative  Endocrine: negative              Physical Exam:   /84   Pulse 66   Temp 98.7  F (37.1  C) (Oral)   Resp 18   Ht 1.829 m (6')   Wt 119.3 kg (263 lb)   SpO2 96%   BMI 35.67 kg/m    General - Well developed, well nourished male in no apparent distress  HEENT:  Head normocephalic and atraumatic, pupils equal and round, conjunctivae clear, no scleral icterus, mucous membranes moist, external ears and nose normal  Neck: Supple without thyromegaly or masses  Lymphatic: No cervical, or supraclavicular lymphadenopathy  Lungs: Clear to auscultation bilaterally  Heart: regular rate and rhythm, no murmurs  Abdomen: normoactive bowel sounds, rounded, soft, mildly tender RUQ with deep palpation, Non Distended. No rebound or guarding.  No masses palpated.  Extremities: Warm without edema  Neurologic: nonfocal  Psychiatric: Mood and affect appropriate  Skin: Without lesions or rashes, or juandice         Data:     Lab Results   Component Value Date     WBC 9.3 04/07/2023    WBC 8.3 09/04/2014     Lab Results   Component Value Date    HGB 13.2 04/07/2023    HGB 14.4 09/04/2014     Lab Results   Component Value Date     04/07/2023     09/04/2014     Last Basic Metabolic Panel:  Lab Results   Component Value Date     04/07/2023     04/25/2019      Lab Results   Component Value Date    POTASSIUM 4.2 04/07/2023    POTASSIUM 4.0 07/21/2022    POTASSIUM 4.3 04/25/2019     Lab Results   Component Value Date    CHLORIDE 107 04/07/2023    CHLORIDE 105 07/21/2022    CHLORIDE 101 04/25/2019     Lab Results   Component Value Date    MICHEAL 8.9 04/07/2023    MICHEAL 9.6 04/25/2019     Lab Results   Component Value Date    CO2 24 04/07/2023    CO2 23 07/21/2022    CO2 28 04/25/2019     Lab Results   Component Value Date    BUN 13.4 04/07/2023    BUN 21 07/21/2022    BUN 24 04/25/2019     Lab Results   Component Value Date    CR 1.38 04/07/2023    CR 1.48 04/25/2019     Lab Results   Component Value Date     04/07/2023     07/21/2022     04/25/2019       Liver Function Studies -   Recent Labs   Lab Test 04/07/23  0721   PROTTOTAL 6.4   ALBUMIN 3.8   BILITOTAL 0.6   ALKPHOS 94   AST 20   ALT 20       All imaging studies reviewed by me.  Imaging:    Results for orders placed or performed during the hospital encounter of 04/06/23   US Abdomen Limited    Narrative    EXAM: US ABDOMEN LIMITED  LOCATION: [facility]  DATE/TIME: 4/6/2023 4:30 PM    INDICATION: Intermittent right upper quadrant pain for 6 days; emesis.  COMPARISON: None available.  TECHNIQUE: Limited abdominal ultrasound.    FINDINGS:    GALLBLADDER: Gallbladder is upper limits of normal size and diffusely thick-walled, measuring 6 mm. Multiple gallstones are present. Technologist reports a negative Evans's sign. No pericholecystic fluid.    BILE DUCTS: No visualized intrahepatic biliary ductal dilatation. Extrahepatic common duct is not visualized,  likely due to overlying bowel gas and shadowing from multiple gallstones.     LIVER: Diffuse hepatic steatosis.     RIGHT KIDNEY: Moderate right renal cortical atrophy, with mild dilatation of the renal pelvis.    PANCREAS: Not visualized due to overlying bowel gas.      Impression    IMPRESSION:  1.  Cholelithiasis and equivocal findings for acute cholecystitis. Correlation with HIDA scan is recommended.  2.  Moderate right renal cortical atrophy and mild dilatation of the right renal pelvis. CT imaging follow-up recommended.  3.  Nonvisualization of the extrahepatic common duct.    Findings discussed verbally via telephone with Dr. Thompson at 5:10 PM on 04/06/2023.             Assessment and Plan:   Assessment:   Biliary colic with cholelithiasis, probable cholecystitis      Plan:   Await results of HIDA scan.    Recommending laparoscopic cholecystectomy.   History, physical exam, lab results and imaging findings thus far are consistent with gallbladder disease.     The pathophysiology of gallbladder disease and complications of cholecystitis and choledocholithiasis were discussed with the patient. The risks associated with the procedure including, but not limited to, infection, bleeding, bile leak, bile duct injury, retained gallstones, drain placement, conversion to open and anesthesia complications were discussed with the patient.  Possible complications requiring further surgical intervention during or after the procedure were also discussed. The patient indicated understanding of the discussion, asked appropriate questions, and wishes to proceed as indicated.          Time spent with the patient, reviewing the EMR, reviewing laboratory and imaging studies, counseling, educating and coordinating care:  90 minutes.    Vinny Hauser PA-C

## 2023-04-07 NOTE — BRIEF OP NOTE
[facility]  General Surgery Brief Operative Note    Pre-operative diagnosis: Acute cholecystitis [K81.0]   Post-operative diagnosis Same   Procedure: Procedure(s):  LAPAROSCOPIC CHOLECYSTECTOMY    Surgeon(s), Assistant(s): Surgeon(s) and Role:     * Davion South MD - Primary     * Vinny Hauser PA-C - Assisting     * Sia Gilbert PA-C - Assisting   Estimated blood loss: See op note   Drains: None   Specimens: ID Type Source Tests Collected by Time Destination   1 : gallbladder and contents Tissue Gallbladder SURGICAL PATHOLOGY EXAM Davion South MD 4/7/2023  3:17 PM       Findings: Thickened gallbladder wall..   Complications:  Condition: None  Stable   Comments:      Sia Gilbert PA-C  [facility]         See dictated operative report for full details

## 2023-04-07 NOTE — PLAN OF CARE
Goal Outcome Evaluation:    Admitted onto floor at 0000. Pt A&Ox4, able to let needs be known. Reports 3/10 RUQ pain- declined medication for it. Abdomen rounded, soft, bowel sounds active and pt reports passing flatus. Low grade temp 99.7. NS infusing at 75ml/hr. NPO since midnight for HIDA scan and general surgery consult. Pt ambulating in room independently. Vital signs stable, call light within reach.   Fabiana Benavides RN on 4/7/2023 at 6:34 AM

## 2023-04-07 NOTE — PROGRESS NOTES
[facility]    Hospitalist Progress Note    Interval History   Patient awake and alert.  No acute events overnight.  Continues to have right upper quadrant abdominal pain but improved since admission.    -Data reviewed today: I reviewed all new labs and imaging results over the last 24 hours. I personally reviewed the abdominal CT image(s) showing Cholecystitis.    Physical Exam   Temp: 98.6  F (37  C) Temp src: Temporal BP: 137/74 Pulse: 68   Resp: 13 SpO2: 92 % O2 Device: None (Room air) Oxygen Delivery: 6 LPM  Vitals:    04/06/23 1857   Weight: 119.3 kg (263 lb)     Vital Signs with Ranges  Temp:  [98  F (36.7  C)-99.7  F (37.6  C)] 98.6  F (37  C)  Pulse:  [60-82] 68  Resp:  [13-18] 13  BP: (128-164)/(74-95) 137/74  SpO2:  [92 %-99 %] 92 %  I/O last 3 completed shifts:  In: 1782.5 [I.V.:1782.5]  Out: -     Physical Exam  Constitutional:       Appearance: Normal appearance. He is obese.   HENT:      Head: Normocephalic.   Eyes:      Pupils: Pupils are equal, round, and reactive to light.   Cardiovascular:      Rate and Rhythm: Normal rate and regular rhythm.      Pulses: Normal pulses.      Heart sounds: Normal heart sounds.   Pulmonary:      Effort: Pulmonary effort is normal. No respiratory distress.      Breath sounds: Normal breath sounds.   Abdominal:      General: Bowel sounds are normal. There is no distension.      Tenderness: There is no abdominal tenderness. There is no guarding.      Comments: Right upper quadrant abdominal pain   Musculoskeletal:         General: Normal range of motion.      Cervical back: Normal range of motion.   Skin:     General: Skin is warm and dry.   Neurological:      General: No focal deficit present.   Psychiatric:         Mood and Affect: Mood normal.           Medications     lactated ringers       sodium chloride 75 mL/hr at 04/07/23 0000       [Auto Hold] allopurinol  300 mg Oral Daily     [Auto Hold] cetirizine  10 mg Oral Daily     [Auto Hold]  fluticasone-vilanterol  1 puff Inhalation Daily     [Auto Hold] losartan  100 mg Oral Daily     [Auto Hold] piperacillin-tazobactam  3.375 g Intravenous Q6H     [Auto Hold] sodium chloride (PF)  3 mL Intracatheter Q8H       Data   Recent Labs   Lab 04/07/23  0721 04/06/23  1545   WBC 9.3 13.6*   HGB 13.2* 14.6   MCV 90 89    276    139   POTASSIUM 4.2 4.4   CHLORIDE 107 101   CO2 24 27   BUN 13.4 14.5   CR 1.38* 1.38*   ANIONGAP 10 11   MICHEAL 8.9 9.7   * 110*   ALBUMIN 3.8 4.3   PROTTOTAL 6.4 7.3   BILITOTAL 0.6 0.4   ALKPHOS 94 105   ALT 20 27   AST 20 27   LIPASE  --  23       Recent Results (from the past 24 hour(s))   US Abdomen Limited    Narrative    EXAM: US ABDOMEN LIMITED  LOCATION: [facility]  DATE/TIME: 4/6/2023 4:30 PM    INDICATION: Intermittent right upper quadrant pain for 6 days; emesis.  COMPARISON: None available.  TECHNIQUE: Limited abdominal ultrasound.    FINDINGS:    GALLBLADDER: Gallbladder is upper limits of normal size and diffusely thick-walled, measuring 6 mm. Multiple gallstones are present. Technologist reports a negative Evans's sign. No pericholecystic fluid.    BILE DUCTS: No visualized intrahepatic biliary ductal dilatation. Extrahepatic common duct is not visualized, likely due to overlying bowel gas and shadowing from multiple gallstones.     LIVER: Diffuse hepatic steatosis.     RIGHT KIDNEY: Moderate right renal cortical atrophy, with mild dilatation of the renal pelvis.    PANCREAS: Not visualized due to overlying bowel gas.      Impression    IMPRESSION:  1.  Cholelithiasis and equivocal findings for acute cholecystitis. Correlation with HIDA scan is recommended.  2.  Moderate right renal cortical atrophy and mild dilatation of the right renal pelvis. CT imaging follow-up recommended.  3.  Nonvisualization of the extrahepatic common duct.    Findings discussed verbally via telephone with Dr. Thompson at 5:10 PM on 04/06/2023.   NM  HepatOBiliary Scan    Narrative    EXAM: NM HEPATOBILIARY SCAN  LOCATION: [facility]  DATE/TIME: 4/7/2023 1:09 PM    INDICATION: gallstone and concern for cholecystitis. Right upper quadrant pain  COMPARISON: Abdominal ultrasound 04/06/2023.  TECHNIQUE: 7.0 mCi of Tc-99m mebrofenin, IV. 2 mg morphine IV. Anterior planar imaging of the abdomen.     FINDINGS: There is nonvisualization of the gallbladder after 60 minutes of imaging, which persists for 30 minutes after the administration of morphine, suggesting cystic duct obstruction/acute cholecystitis. Normal radionuclide activity in liver, bile   ducts, and small bowel. No evidence of common duct obstruction or intrinsic liver disease.        Impression    IMPRESSION:     Findings suspicious for acute cholecystitis.      - - - - - - - - - - - - - - - - - - - - - - - - - - - - - - - - - - - - - - - - - - - - - - - - - - - - - - - - - - - - - - - - - - - - - - - - - - - -   The results above were discussed with Dr. South on 4/7/2023 12:45 PM by Dr. Cammie Brennan.  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 who presented to the urgent care earlier in the day with a chief complaint of abdominal pain and work-up revealed that he has gallstones with likely acute cholecystitis and sent for further work-up           Abdominal pain likely secondary due to cholelithiasis and likely acute cholecystitis  -He has been having abdominal pain for the past 6 days in the right upper quadrant  -His lipase level was normal at 23 and his ALT AST and total bilirubin and alkaline phosphatase were normal  -Ultrasound  was done at urgent care of the abdomen which showed cholelithiasis and acute focal findings concerning for acute cholecystitis and nonvisualization of the extrahepatic common duct and moderate right renal cortical atrophy and mild dilatation of the right renal pelvis  -The ED physician did speak with general surgery team and HIDA scan has been ordered and consult has been placed to general surgery  -We will start him on clear liquid diet, IV Dilaudid, nausea and vomiting medications, IV Zosyn and n.p.o. after midnight  -HIDA scan done on 4/7/2023 was suspicious for acute cholecystitis.  -Plan on proceeding with  cholecystectomy today.  Continue n.p.o. status and IV antibiotics.     Right renal cortical atrophy-chronic  Incidental finding of right renal pelvis dilatation  -Ultrasound of the abdomen did mention that Moderate right renal cortical atrophy and mild dilatation of the right renal pelvis. CT imaging follow-up recommended  -He does have only one functioning kidney and follows with Dr. Poole from [facility] consultant as outpatient and has requested to see them and consult has been placed  -Creatinine stable at 1.3.     Hypertension  -On losartan.  Hydrochlorothiazide held for now.     Gout  -We will continue with PTA allopurinol     Hyperlipidemia  -We we will hold his PTA pravastatin for now     Cold-induced asthma  -We will continue with PTA Breo              Diet:    DVT Prophylaxis: Pneumatic Compression Devices  Crane Catheter: Not present  Lines: None     Cardiac Monitoring: None  Code Status:  Full code    Clinically Significant Risk Factors Present on Admission                  # Hypertension: home medication list includes antihypertensive(s)      # Obesity: Estimated body mass index is 35.67 kg/m  as calculated from the following:    Height as of this encounter: 1.829 m (6').    Weight as of this encounter: 119.3 kg (263 lb).             Mariola Rasmussen MD, MD  [phone](p)

## 2023-04-07 NOTE — PROGRESS NOTES
RECEIVING UNIT ED HANDOFF REVIEW    ED Nurse Handoff Report was reviewed by: Fabiana Benavides RN on April 6, 2023 at 11:22 PM

## 2023-04-07 NOTE — ANESTHESIA PROCEDURE NOTES
Airway       Patient location during procedure: OR       Procedure Start/Stop Times: 4/7/2023 1:19 PM  Staff -        CRNA: Debbie Barbosa APRN CRNA       Performed By: CRNA  Consent for Airway        Urgency: elective  Indications and Patient Condition       Indications for airway management: larisa-procedural       Induction type:RSI      Final Airway Details       Final airway type: endotracheal airway       Successful airway: ETT - single  Endotracheal Airway Details        ETT size (mm): 7.0       Successful intubation technique: direct laryngoscopy       DL Blade Type: MAC 4       Grade View of Cords: 1       Adjucts: stylet       Position: Right       Measured from: gums/teeth       Secured at (cm): 23       Bite block used: None    Post intubation assessment        Placement verified by: capnometry, equal breath sounds and chest rise        Secured with: silk tape       Ease of procedure: easy       Dentition: Intact and Unchanged    Medication(s) Administered   Medication Administration Time: 4/7/2023 1:19 PM

## 2023-04-07 NOTE — ANESTHESIA CARE TRANSFER NOTE
Patient: Nash Hauser    Procedure: Procedure(s):  LAPAROSCOPIC CHOLECYSTECTOMY       Diagnosis: Acute cholecystitis [K81.0]  Diagnosis Additional Information: No value filed.    Anesthesia Type:   General     Note:    Oropharynx: oropharynx clear of all foreign objects  Level of Consciousness: awake  Oxygen Supplementation: face mask  Level of Supplemental Oxygen (L/min / FiO2): 6  Independent Airway: airway patency satisfactory and stable  Dentition: dentition unchanged      Patient transferred to: PACU    Handoff Report: Identifed the Patient, Identified the Reponsible Provider, Reviewed the pertinent medical history, Discussed the surgical course, Reviewed Intra-OP anesthesia mangement and issues during anesthesia, Set expectations for post-procedure period and Allowed opportunity for questions and acknowledgement of understanding      Vitals:  Vitals Value Taken Time   /75 04/07/23 1600   Temp     Pulse 71 04/07/23 1604   Resp 17 04/07/23 1604   SpO2 94 % 04/07/23 1604   Vitals shown include unvalidated device data.    Electronically Signed By: DAISHA Chaney CRNA  April 7, 2023  4:05 PM

## 2023-04-07 NOTE — ED NOTES
[facility]  ED Nurse Handoff Report    ED Chief complaint: Abdominal Pain      ED Diagnosis:   Final diagnoses:   Biliary colic       Code Status: Full Code    Allergies:   Allergies   Allergen Reactions     Atorvastatin      Other reaction(s): myalgia     No Known Drug Allergies      Seasonal Allergies        Patient Story: Pt presents from outside facility with L sided abd pain x6 days. N/v today, no diarrhea. Urgent sent pt here for imaging read. Pain 6/10 upon arrival.  Focused Assessment:  AOx4, denies n/v in ED.     Treatments and/or interventions provided: PIV; IVF; hydromorphone; allopurinol; losartan    Patient's response to treatments and/or interventions: Tolerated.     To be done/followed up on inpatient unit:  Monitor    Does this patient have any cognitive concerns?: n/a    Activity level - Baseline/Home:  Independent  Activity Level - Current:   Independent    Patient's Preferred language: English   Needed?: No    Isolation: None  Infection: Not Applicable    Patient tested for COVID 19 prior to admission: NO  Bariatric?: No    Vital Signs:   Vitals:    04/06/23 1857 04/06/23 1942 04/06/23 2000 04/06/23 2100   BP: (!) 159/88 (!) 155/82 (!) 152/80 (!) 144/83   Pulse: 72  82 64   Resp: 16 16     Temp: 98  F (36.7  C)      TempSrc: Oral      SpO2: 98% 98% 98% 97%   Weight: 119.3 kg (263 lb)      Height: 1.829 m (6')          Cardiac Rhythm:     Was the PSS-3 completed:   Yes  What interventions are required if any?               Family Comments: N/A  OBS brochure/video discussed/provided to patient/family: N/A              Name of person given brochure if not patient:               Relationship to patient:     For the majority of the shift this patient's behavior was Green.   Behavioral interventions performed were .    ED NURSE PHONE NUMBER: [phone]

## 2023-04-07 NOTE — DISCHARGE INSTRUCTIONS
[facility] - [facility]  Discharge Instructions: Post-Operative Laparoscopic Cholecystectomy    ACTIVITY  Expect to feel tired after your surgery.  This will gradually resolve.    Take frequent, short walks and increase your activity gradually.    Avoid strenuous physical activity or heavy lifting greater than 15-20 lbs. for 2-3 weeks.  You may climb stairs.  You may drive without restrictions when you are not using any prescription pain medication and feel comfortable in a car.  You may return to work/school when you are comfortable without any prescription pain medication.    WOUND CARE  You may remove your outer dressing or Band-Aids and shower 48 hours after the surgery.  Pat your incisions dry and leave them open to air.  Re-apply dressing (Band-Aids or gauze/tape) as needed for comfort or drainage.  You may have steri-strips (looks like white tape) on your incision.  You may peel off the steri-strips 2 weeks after your surgery if they have not peeled off on their own.   Do not soak your incisions in a tub or pool for 2 weeks.   Do not apply any lotions, creams, or ointments to your incisions.  A ridge under your incisions is normal and will gradually resolve.    DIET  Start with liquids, then gradually resume your regular diet as tolerated.  Avoid heavy, spicy, and greasy meals for 2-3 days.  Drink plenty of fluids to stay hydrated.  It is not uncommon to experience some loose stools or diarrhea after surgery.  This is your body's way of adapting to the bile which will slowly drain into your intestine.  A low fat diet may help with this.  This should improve over 1-2 months.    PAIN  Expect some tenderness and discomfort at the incision sites.  Use the prescribed pain medication at your discretion.  Expect gradual resolution of your pain over several days.  You may take ibuprofen with food (unless you have been told not to) or acetaminophen/Tylenol instead of or in addition to your prescribed  pain medication.  However, if you are taking Norco or Percocet, do not take any additional acetaminophen/Tylenol.  Do not drink alcohol or drive while you are taking pain medications.  You may apply ice to your incisions in 20 minute intervals as needed for the next 48 hours.  After that time, consider switching to heat if you prefer.    EXPECTATIONS  Pain medications can cause constipation.  Limit use when possible.  Take an over the counter or prescribed stool softener/stimulant, such as Colace or Senna, 1-2 times a day with plenty of water.  You may take a mild over the counter laxative, such as Miralax or a suppository, as needed.  You may discontinue these medications once you are having regular bowel movements and/or are no longer taking your narcotic pain medication.    You may have shoulder or upper back discomfort due to the gas used in surgery.  This is temporary and should resolve in 48-72 hours.  Short, frequent walks may help with this.  If you are unable to urinate for 8 hours or feel as though you are not emptying your bladder adequately, we recommend you seek care at an ER or Urgent Care facility for possible catheter placement.     FOLLOW UP  Our office will contact you in approximately 2-3 weeks to check on your progress and answer any questions you may have.  If you are doing well, you will not need to return for a follow up appointment.  If any concerns are identified over the phone, we will help you make an appointment to see a provider.   If you have not received a phone call, have any questions or concerns, or would like to be seen, please call us at [phone] and ask to speak with our nurse.  We are located at [address] [place] [address].    CALL OUR OFFICE -[phone] IF YOU HAVE:   Chills or fever above 101 F.  Increased redness, warmth, or drainage at your incisions.  Significant bleeding.  Pain not relieved by your pain medication or rest.  Increasing pain  after the first 48 hours.  Any other concerns or questions.                      Revised December 2021

## 2023-04-08 NOTE — PROGRESS NOTES
Discharge Note    Patient discharged to home via private vehicle  accompanied by significant other .  IV: Discontinued  Prescriptions filled and given to patient/family.   Belongings reviewed and sent with patient.   Home medications returned to patient: NA  Equipment sent with: patient, N/A.   patient verbalizes understanding of discharge instructions. AVS given to patient.    Pt discharged to home with significant other 4/7/23 2200. Status post lap amol POD0. Pain controlled. Voiding adequately. Tolerating diet. Discharged by Dr. Wyatt.

## 2023-04-08 NOTE — PROVIDER NOTIFICATION
Paged and spoke with Dr. Wyatt as patient requesting to discharge home this evening.  Pt tolerated oxy for pain, tolerating small amount of food and plenty of fluids.  Pt up in room ambulating independently.  Pt has only voided 250cc at this time (was bladder scanned in pacu for over 600cc).  Per Dr. Wyatt it is ok for patient to discharge from surgery standpoint tonight if he is able to mostly empty his bladder.    Pt has discharge medications filled and post-op surgery instructions in place at this time.    Report given to on-coming RN.           Paged by RN    No hospitalist available at this time to discharge.  Spoke to the captain.  Surgery can discharge tonight or patient has to wait for hospital team tomorrow to discharge.  FRANK PRIEST MD

## 2023-04-11 LAB
PATH REPORT.COMMENTS IMP SPEC: NORMAL
PATH REPORT.COMMENTS IMP SPEC: NORMAL
PATH REPORT.FINAL DX SPEC: NORMAL
PATH REPORT.GROSS SPEC: NORMAL
PATH REPORT.MICROSCOPIC SPEC OTHER STN: NORMAL
PATH REPORT.RELEVANT HX SPEC: NORMAL
PHOTO IMAGE: NORMAL

## 2023-04-11 NOTE — OP NOTE
Surgeon: Davion South MD  1st Assistant: Vinny Hauser PA-C, The physicians assistant was medically necessary for their expertise in camera management, suctioning, suturing, and retraction.  PREOPERATIVE DIAGNOSIS: Acute cholecystitis.   POSTOPERATIVE DIAGNOSES: Acute on chronic gallstone cholecystitis  PROCEDURE: Laparoscopic cholecystectomy (D2 due to inflammation).   ANESTHESIA: General.   ESTIMATED BLOOD LOSS: Less than 50 mL.   OPERATIVE PROCEDURE: After induction of general endotracheal anesthesia, Nash REDDY Murtaza abdomen was prepped and draped in the usual sterile fashion. With the Valeriano technique in an periumbilical location, the abdomen was entered and pneumoperitoneum was established. Three additional 5 mm trocars were placed along the right hypochondrium.  Multiple adhesions from the gallbladder to the omentum and duodenum were taken down, the gallbladder needed to be decompressed in order to gravity.  Eventually the gallbladder fundus was retracted cephalad and lateral and the infundibulum was retracted caudad and lateral. The peritoneum investing the triangle of Calot was incised with electrocautery and dissected bluntly. The critical view of a single cystic duct, single cystic artery was achieved, this was a very tedious dissection given the degree of inflammation and the amount of gallstones within the gallbladder.  The size of the duct was larger than what the clip could handle so this was controlled and transected with ALFREDO stapler.  The cystic artery was doubly clipped on the patient's side, singly clipped on the gallbladder side and transected sharply. The gallbladder was detached from the liver with electrocautery and blunt dissection. The specimen was removed from the patient's abdomen and sent to pathology. The gallbladder fossa was inspected. Hemostasis was secured, and no evidence of bile leakage noted. The abdomen was surveyed and no other pathology seen. The trocars were then removed under  direct visualization without evidence of bleeding. Periumbilical port was closed with multiple interrupted 0 Vicryl suture. Skin was approximated with absorbable sutures. Steri-Strips and sterile dressing applied. No immediate complications.   DASHAWN HERNANDEZ MD

## 2023-04-12 NOTE — DISCHARGE SUMMARY
[facility]    Discharge Summary  Hospitalist    Date of Admission:  4/6/2023  Date of Discharge:  4/12/2023    Discharge Diagnoses      Biliary colic  Acute cholecystitis    History of Present Illness   Nash Hauser is an 58 year old male who presented with abd pain and cholecystitis     Hospital Course   Nash Hauser was admitted on 4/6/2023.  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 who presented to the urgent care earlier in the day with a chief complaint of abdominal pain and work-up revealed that he has gallstones with likely acute cholecystitis and sent for further work-up           Abdominal pain likely secondary due to cholelithiasis and likely acute cholecystitis  -He has been having abdominal pain for the past 6 days in the right upper quadrant  -His lipase level was normal at 23 and his ALT AST and total bilirubin and alkaline phosphatase were normal  -Ultrasound was done at urgent care of the abdomen which showed cholelithiasis and acute focal findings concerning for acute cholecystitis and nonvisualization of the extrahepatic common duct and moderate right renal cortical atrophy and mild dilatation of the right renal pelvis  -The ED physician did speak with general surgery team and HIDA scan has been ordered and consult has been placed to general surgery  -We will start him on clear liquid diet, IV Dilaudid, nausea and vomiting medications, IV Zosyn and n.p.o. after midnight  -HIDA scan done on 4/7/2023 was suspicious for acute cholecystitis.  -Underwent laparoscopic cholecystectomy on 4/7/2023.  Tolerated the procedure well.  Was discharged later that evening by surgery team.  -No antibiotics on discharge.  Was discharged on pain medication.     Right renal  cortical atrophy-chronic  Incidental finding of right renal pelvis dilatation  -Ultrasound of the abdomen did mention that Moderate right renal cortical atrophy and mild dilatation of the right renal pelvis. CT imaging follow-up recommended  -He does have only one functioning kidney and follows with Dr. Poole from [facility] consultant as outpatient and has requested to see them and consult has been placed  -Creatinine stable at 1.3.     Hypertension  -On losartan.  Hydrochlorothiazide held on admission but restarted upon discharge     Gout  -We will continue with PTA allopurinol     Hyperlipidemia  -We we will hold his PTA pravastatin for now     Cold-induced asthma  -We will continue with PTA Breo              Diet:    DVT Prophylaxis: Pneumatic Compression Devices  Crane Catheter: Not present  Lines: None     Cardiac Monitoring: None  Code Status:  Full code           Clinically Significant Risk Factors Present on Admission                   # Hypertension: home medication list includes antihypertensive(s)      # Obesity: Estimated body mass index is 35.67 kg/m  as calculated from the following:    Height as of this encounter: 1.829 m (6').    Weight as of this encounter: 119.3 kg (263 lb).           Mariola Rasmussen MD, MD  [phone](p)      Clinically Significant Risk Factors                        # Obesity: Estimated body mass index is 35.67 kg/m  as calculated from the following:    Height as of this encounter: 1.829 m (6').    Weight as of this encounter: 119.3 kg (263 lb)., PRESENT ON ADMISSION          Mariola Rasmussen MD, MD    Code Status   Full Code       Primary Care Physician   Tl Prabhakar    Physical Exam                     Vitals:    04/06/23 1857   Weight: 119.3 kg (263 lb)     Vital Signs with Ranges     No intake/output data recorded.    Physical Exam  Constitutional:       Appearance: Normal appearance.   HENT:      Head: Normocephalic.   Eyes:      Pupils: Pupils are equal, round, and  reactive to light.   Cardiovascular:      Rate and Rhythm: Normal rate and regular rhythm.      Pulses: Normal pulses.      Heart sounds: Normal heart sounds.   Pulmonary:      Effort: Pulmonary effort is normal. No respiratory distress.      Breath sounds: Normal breath sounds.   Abdominal:      General: Abdomen is flat. Bowel sounds are normal. There is no distension.      Tenderness: There is no abdominal tenderness. There is no guarding.      Comments: Postop changes   Musculoskeletal:         General: Normal range of motion.      Cervical back: Normal range of motion.   Skin:     General: Skin is warm and dry.   Neurological:      General: No focal deficit present.   Psychiatric:         Mood and Affect: Mood normal.           Discharge Disposition   Discharged to home  Condition at discharge: Stable    Consultations This Hospital Stay   PHARMACY TO DOSE MEDICATION  SURGERY GENERAL IP CONSULT  NEPHROLOGY IP CONSULT    Time Spent on this Encounter   IMariola MD, personally saw the patient today and spent greater than 30 minutes discharging this patient.    Discharge Orders      Follow-up and recommended labs and tests     Our office will contact you in approximately 2-3 weeks to check on your progress and answer any questions you may have.  If you are doing well, you will not need to return for a follow up appointment.  If any concerns are identified over the phone, we will help you make an appointment to see a provider.    If you have not received a phone call, have any questions or concerns, or would like to be seen, please call us at [phone] and ask to speak with our nurse.  We are located at [address] [place] [address].     Activity    Your activity upon discharge:     Expect to feel tired after your surgery.  This will gradually resolve.    Take frequent, short walks and increase your activity gradually.    Avoid strenuous physical activity or heavy lifting greater  than 15-20 lbs. for 2-3 weeks.  You may climb stairs.  You may drive without restrictions when you are not using any prescription pain medication and feel comfortable in a car.  You may return to work/school when you are comfortable without any prescription pain medication.     When to contact your care team    CALL OUR OFFICE -[phone] IF YOU HAVE:   Chills or fever above 101 F.  Increased redness, warmth, or drainage at your incisions.  Significant bleeding.  Pain not relieved by your pain medication or rest.  Increasing pain after the first 48 hours.  Any other concerns or questions.     Wound care and dressings    Instructions to care for your wound at home:    You may remove your outer dressing or Band-Aids and shower 48 hours after the surgery.  Pat your incisions dry and leave them open to air.  Re-apply dressing (Band-Aids or gauze/tape) as needed for comfort or drainage.  You may have steri-strips (looks like white tape) on your incision.  You may peel off the steri-strips 2 weeks after your surgery if they have not peeled off on their own.   Do not soak your incisions in a tub or pool for 2 weeks.   Do not apply any lotions, creams, or ointments to your incisions.  A ridge under your incisions is normal and will gradually resolve.     Reason for your hospital stay    Acute cholecystitis     Follow-up and recommended labs and tests     Please follow up with PCP in 1-2 weeks and call [phone] with any concerns related to surgery.     Activity    Your activity upon discharge: activity as tolerated and activity as tolerated and no driving for today     Diet    Follow this diet upon discharge:    Start with liquids, then gradually resume your regular diet as tolerated.  Avoid heavy, spicy, and greasy meals for 2-3 days.  Drink plenty of fluids to stay hydrated.  It is not uncommon to experience some loose stools or diarrhea after surgery.  This is your body's way of adapting to the bile which will  slowly drain into your intestine.  A low fat diet may help with this.  This should improve over 1-2 months.     Diet    Follow this diet upon discharge: Orders Placed This Encounter      Regular Diet Adult      Diet     Discharge Medications   Discharge Medication List as of 4/7/2023  9:25 PM      START taking these medications    Details   acetaminophen (TYLENOL) 325 MG tablet Take 1-2 tablets (325-650 mg) by mouth every 6 hours as needed for mild pain, Disp-30 tablet, R-0, E-Prescribe      oxyCODONE (ROXICODONE) 5 MG tablet Take 1 tablet (5 mg) by mouth every 6 hours as needed for pain, Disp-12 tablet, R-0, E-Prescribe      senna-docusate (SENOKOT-S/PERICOLACE) 8.6-50 MG tablet Take 1 tablet by mouth daily for 14 days, Disp-14 tablet, R-0, E-Prescribe         CONTINUE these medications which have NOT CHANGED    Details   allopurinol (ZYLOPRIM) 300 MG tablet Take 300 mg by mouth daily, Historical      BREO ELLIPTA 200-25 MCG/INH Inhaler Inhale 1 puff into the lungs daily, DANIEL, Historical      cetirizine (ZYRTEC) 10 MG tablet Take 10 mg by mouth daily, Historical      hydrochlorothiazide (HYDRODIURIL) 25 MG tablet TAKE 1 TABLET BY MOUTH EVERY DAY, Disp-90 tablet, R-0, E-Prescribe      losartan (COZAAR) 100 MG tablet TAKE 1 TABLET BY MOUTH EVERY DAY, Disp-90 tablet, R-0, E-Prescribe      pravastatin (PRAVACHOL) 20 MG tablet Take 1 tablet (20 mg) by mouth daily, Disp-90 tablet, R-3, E-Prescribe      VENTOLIN  (90 Base) MCG/ACT inhaler INHALE 2 PUFFS INTO THE LUNGS EVERY 4 HOURS AS NEEDED FOR SHORTNESS OF BREATH / DYSPNEA OR WHEEZING, Disp-18 Inhaler, R-3, E-Prescribe           Allergies   Allergies   Allergen Reactions     Atorvastatin      Other reaction(s): myalgia     No Known Drug Allergies      Seasonal Allergies      Data   Recent Labs   Lab Test 04/07/23  0721 04/06/23  1545 07/21/22  0912   WBC 9.3 13.6*  --    HGB 13.2* 14.6 13.3   MCV 90 89  --     276  --       Recent Labs   Lab Test  04/07/23  0721 04/06/23  1545 07/21/22  0912    139 138   POTASSIUM 4.2 4.4 4.0   CHLORIDE 107 101 105   CO2 24 27 23   BUN 13.4 14.5 21   CR 1.38* 1.38* 1.36*   ANIONGAP 10 11 10   MICHEAL 8.9 9.7 9.0   * 110* 105*         Results for orders placed or performed during the hospital encounter of 04/06/23   NM HepatOBiliary Scan    Narrative    EXAM: NM HEPATOBILIARY SCAN  LOCATION: [facility]  DATE/TIME: 4/7/2023 1:09 PM    INDICATION: gallstone and concern for cholecystitis. Right upper quadrant pain  COMPARISON: Abdominal ultrasound 04/06/2023.  TECHNIQUE: 7.0 mCi of Tc-99m mebrofenin, IV. 2 mg morphine IV. Anterior planar imaging of the abdomen.     FINDINGS: There is nonvisualization of the gallbladder after 60 minutes of imaging, which persists for 30 minutes after the administration of morphine, suggesting cystic duct obstruction/acute cholecystitis. Normal radionuclide activity in liver, bile   ducts, and small bowel. No evidence of common duct obstruction or intrinsic liver disease.        Impression    IMPRESSION:     Findings suspicious for acute cholecystitis.      - - - - - - - - - - - - - - - - - - - - - - - - - - - - - - - - - - - - - - - - - - - - - - - - - - - - - - - - - - - - - - - - - - - - - - - - - - - -   The results above were discussed with Dr. South on 4/7/2023 12:45 PM by Dr. Cammie Brennan.  - - - - - - - - - - - - - - - - - - - - - - - - - - - - - - - - - - - - - - - - - - - - - - - - - - - - - - - - - - - - - - - - - - - - - - - - - - - -

## 2023-04-27 ENCOUNTER — TELEPHONE (OUTPATIENT)
Dept: SURGERY | Facility: CLINIC | Age: 58
End: 2023-04-27
Payer: COMMERCIAL

## 2023-04-27 NOTE — TELEPHONE ENCOUNTER
[facility]  Post op call note     Nash Hauser was called for an update regarding his recovery.  He underwent a laparoscopic cholecystectomy by Dr. South on 4/7/23.  Today he tells me he is doing well and denies any complaints.  He states his wounds are healing well.  The pathology revealed acute cholecystitis with cholelithiasis.  This was discussed with the patient.  He was instructed to remove steri strips and gradually resume any strenuous activity or exercise program over the course of a week.  The patient states he understands our discussion and all of his questions were answered.  The patient agrees to follow up in clinic as needed.      Sia Gilbert PA-C

## 2023-08-21 DIAGNOSIS — E78.5 HYPERLIPIDEMIA WITH TARGET LDL LESS THAN 130: ICD-10-CM

## 2023-08-22 RX ORDER — PRAVASTATIN SODIUM 20 MG
20 TABLET ORAL DAILY
Qty: 90 TABLET | Refills: 0 | Status: SHIPPED | OUTPATIENT
Start: 2023-08-22 | End: 2023-11-20

## 2023-09-12 ENCOUNTER — OFFICE VISIT (OUTPATIENT)
Dept: FAMILY MEDICINE | Facility: CLINIC | Age: 58
End: 2023-09-12
Payer: COMMERCIAL

## 2023-09-12 VITALS
BODY MASS INDEX: 36.98 KG/M2 | WEIGHT: 273 LBS | RESPIRATION RATE: 12 BRPM | OXYGEN SATURATION: 97 % | DIASTOLIC BLOOD PRESSURE: 76 MMHG | HEART RATE: 64 BPM | HEIGHT: 72 IN | SYSTOLIC BLOOD PRESSURE: 114 MMHG | TEMPERATURE: 98.8 F

## 2023-09-12 DIAGNOSIS — B37.2 CANDIDAL INTERTRIGO: ICD-10-CM

## 2023-09-12 DIAGNOSIS — I10 ESSENTIAL (PRIMARY) HYPERTENSION: ICD-10-CM

## 2023-09-12 DIAGNOSIS — R73.03 PREDIABETES: ICD-10-CM

## 2023-09-12 DIAGNOSIS — Z00.00 ROUTINE GENERAL MEDICAL EXAMINATION AT A HEALTH CARE FACILITY: Primary | ICD-10-CM

## 2023-09-12 DIAGNOSIS — M1A.0720 CHRONIC GOUT OF LEFT FOOT, UNSPECIFIED CAUSE: ICD-10-CM

## 2023-09-12 DIAGNOSIS — E78.5 HYPERLIPIDEMIA WITH TARGET LDL LESS THAN 130: ICD-10-CM

## 2023-09-12 DIAGNOSIS — Z12.5 SCREENING FOR PROSTATE CANCER: ICD-10-CM

## 2023-09-12 DIAGNOSIS — J45.40 MODERATE PERSISTENT ASTHMA WITHOUT COMPLICATION: ICD-10-CM

## 2023-09-12 DIAGNOSIS — Q89.9 UMBILICAL ABNORMALITY: ICD-10-CM

## 2023-09-12 DIAGNOSIS — N27.0 UNILATERAL SMALL KIDNEY: ICD-10-CM

## 2023-09-12 DIAGNOSIS — N18.31 CHRONIC KIDNEY DISEASE, STAGE 3A (H): ICD-10-CM

## 2023-09-12 DIAGNOSIS — Z87.09 HX OF PLEURISY: ICD-10-CM

## 2023-09-12 DIAGNOSIS — E66.01 MORBID OBESITY DUE TO EXCESS CALORIES (H): ICD-10-CM

## 2023-09-12 LAB
ALBUMIN SERPL BCG-MCNC: 4.1 G/DL (ref 3.5–5.2)
ANION GAP SERPL CALCULATED.3IONS-SCNC: 11 MMOL/L (ref 7–15)
BUN SERPL-MCNC: 19.1 MG/DL (ref 6–20)
CALCIUM SERPL-MCNC: 9.5 MG/DL (ref 8.6–10)
CHLORIDE SERPL-SCNC: 103 MMOL/L (ref 98–107)
CREAT SERPL-MCNC: 1.39 MG/DL (ref 0.67–1.17)
CREAT UR-MCNC: 28.4 MG/DL
DEPRECATED HCO3 PLAS-SCNC: 25 MMOL/L (ref 22–29)
EGFRCR SERPLBLD CKD-EPI 2021: 59 ML/MIN/1.73M2
GLUCOSE SERPL-MCNC: 96 MG/DL (ref 70–99)
HBA1C MFR BLD: 5.8 % (ref 0–5.6)
MICROALBUMIN UR-MCNC: <12 MG/L
MICROALBUMIN/CREAT UR: NORMAL MG/G{CREAT}
PHOSPHATE SERPL-MCNC: 3.4 MG/DL (ref 2.5–4.5)
POTASSIUM SERPL-SCNC: 4.4 MMOL/L (ref 3.4–5.3)
PSA SERPL DL<=0.01 NG/ML-MCNC: 0.79 NG/ML (ref 0–3.5)
SODIUM SERPL-SCNC: 139 MMOL/L (ref 136–145)
URATE SERPL-MCNC: 5.5 MG/DL (ref 3.4–7)

## 2023-09-12 PROCEDURE — 90471 IMMUNIZATION ADMIN: CPT | Performed by: STUDENT IN AN ORGANIZED HEALTH CARE EDUCATION/TRAINING PROGRAM

## 2023-09-12 PROCEDURE — 36415 COLL VENOUS BLD VENIPUNCTURE: CPT | Performed by: STUDENT IN AN ORGANIZED HEALTH CARE EDUCATION/TRAINING PROGRAM

## 2023-09-12 PROCEDURE — 90472 IMMUNIZATION ADMIN EACH ADD: CPT | Performed by: STUDENT IN AN ORGANIZED HEALTH CARE EDUCATION/TRAINING PROGRAM

## 2023-09-12 PROCEDURE — 90677 PCV20 VACCINE IM: CPT | Performed by: STUDENT IN AN ORGANIZED HEALTH CARE EDUCATION/TRAINING PROGRAM

## 2023-09-12 PROCEDURE — G0103 PSA SCREENING: HCPCS | Performed by: STUDENT IN AN ORGANIZED HEALTH CARE EDUCATION/TRAINING PROGRAM

## 2023-09-12 PROCEDURE — 82043 UR ALBUMIN QUANTITATIVE: CPT | Performed by: STUDENT IN AN ORGANIZED HEALTH CARE EDUCATION/TRAINING PROGRAM

## 2023-09-12 PROCEDURE — 84550 ASSAY OF BLOOD/URIC ACID: CPT | Performed by: STUDENT IN AN ORGANIZED HEALTH CARE EDUCATION/TRAINING PROGRAM

## 2023-09-12 PROCEDURE — 83036 HEMOGLOBIN GLYCOSYLATED A1C: CPT | Performed by: STUDENT IN AN ORGANIZED HEALTH CARE EDUCATION/TRAINING PROGRAM

## 2023-09-12 PROCEDURE — 99396 PREV VISIT EST AGE 40-64: CPT | Mod: 25 | Performed by: STUDENT IN AN ORGANIZED HEALTH CARE EDUCATION/TRAINING PROGRAM

## 2023-09-12 PROCEDURE — 90682 RIV4 VACC RECOMBINANT DNA IM: CPT | Performed by: STUDENT IN AN ORGANIZED HEALTH CARE EDUCATION/TRAINING PROGRAM

## 2023-09-12 PROCEDURE — 99214 OFFICE O/P EST MOD 30 MIN: CPT | Mod: 25 | Performed by: STUDENT IN AN ORGANIZED HEALTH CARE EDUCATION/TRAINING PROGRAM

## 2023-09-12 PROCEDURE — 80069 RENAL FUNCTION PANEL: CPT | Performed by: STUDENT IN AN ORGANIZED HEALTH CARE EDUCATION/TRAINING PROGRAM

## 2023-09-12 PROCEDURE — 82570 ASSAY OF URINE CREATININE: CPT | Performed by: STUDENT IN AN ORGANIZED HEALTH CARE EDUCATION/TRAINING PROGRAM

## 2023-09-12 RX ORDER — NYSTATIN 100000 U/G
CREAM TOPICAL 2 TIMES DAILY
Qty: 30 G | Refills: 0 | Status: SHIPPED | OUTPATIENT
Start: 2023-09-12

## 2023-09-12 ASSESSMENT — ENCOUNTER SYMPTOMS
DIARRHEA: 0
WEAKNESS: 0
SORE THROAT: 0
NAUSEA: 0
ABDOMINAL PAIN: 0
HEMATURIA: 0
SHORTNESS OF BREATH: 0
PALPITATIONS: 0
DIZZINESS: 0
HEMATOCHEZIA: 0
CHILLS: 0
FEVER: 0
CONSTIPATION: 0
COUGH: 0
NERVOUS/ANXIOUS: 0
DYSURIA: 0
PARESTHESIAS: 0
FREQUENCY: 0
MYALGIAS: 0
ARTHRALGIAS: 0
HEARTBURN: 0
JOINT SWELLING: 0
EYE PAIN: 0
HEADACHES: 0

## 2023-09-12 ASSESSMENT — PAIN SCALES - GENERAL: PAINLEVEL: NO PAIN (0)

## 2023-09-12 ASSESSMENT — ASTHMA QUESTIONNAIRES: ACT_TOTALSCORE: 24

## 2023-09-12 NOTE — PROGRESS NOTES
SUBJECTIVE:   CC: Nash is an 58 year old who presents for preventative health visit.       9/12/2023     8:13 AM   Additional Questions   Roomed by Jing Pinedo MARCY     Has a rash under R armpit and itching in belly button that has been on and off for the last two months.    Has been having a chest pain issue where he will wake up with pain in L chest that happened once at the beginning of August.    Is wondering if he can get all med refills through here.    Healthy Habits:     Getting at least 3 servings of Calcium per day:  NO    Bi-annual eye exam:  Yes    Dental care twice a year:  Yes    Sleep apnea or symptoms of sleep apnea:  Sleep apnea    Diet:  Low salt and Low fat/cholesterol    Frequency of exercise:  2-3 days/week    Duration of exercise:  15-30 minutes    Taking medications regularly:  Yes    Medication side effects:  Not applicable    Additional concerns today:  No    Gout  Nonfunctioning R kidney  Working with [facility], Dr. Winchester.   Has yearly visit.   Is on daily allopurinol, prescribed by him.  No issues with gout since initiation of allopurinol.    HTN  Taking losartan and hydrochlorothiazide.   No issues. Tolerating well.  BP WNL    HLD  Taking statin, tolerating well without myalgias.  Had myalgias with many prior statin medications.    Asthma  Seeing allergist who is managing the asthma with breo and albuterol.  Allergies trigger his asthma. Worsened later on in age. Takes cetrizine daily.  On allergy shots for the past 3 years.     Bellybutton  Is pretty itchy, tried cortisone, which has improved symptoms.  But never causes it to fully resolve  Symptoms started after gallbladder laparoscopic surgery.    Rash in R armpit  Ongoing for the past 6 weeks.   Hasn't used deodarant for past 6 weeks on that side.  Used cortisone for symptoms which resolved it but then keeps coming back.  Had similar issue 25 years ago, had ointment and went away after that.    Chest pain  One episode few  weeks or months ago.  Occurred in the early morning (4 or 5AM) where he couldn't take more than 1/2 breath without severe pain in L chest.  Occurred upon waking up.   Was quite sharp. Only hurt with taking a breath. No pain when not breathing in.   If rolled over, made it worse.   After 15-20 minutes, went away completely.  No palpitations, no fever, cough, no jaw claudication, no arm pain. No N/V.  Has fairly active job and walks 3-4 days a week. Not symptomatic at all during those episodes.  No recurrent symptoms since that time.    Social History     Tobacco Use     Smoking status: Never     Smokeless tobacco: Never   Substance Use Topics     Alcohol use: Yes     Alcohol/week: 3.3 - 5.0 standard drinks of alcohol     Types: 4 - 6 Standard drinks or equivalent per week     Comment: 7-8 beers per week         9/12/2023     8:10 AM   Alcohol Use   Prescreen: >3 drinks/day or >7 drinks/week? Yes   AUDIT SCORE  4         9/12/2023     8:10 AM   AUDIT - Alcohol Use Disorders Identification Test - Reproduced from the World Health Organization Audit 2001 (Second Edition)   1.  How often do you have a drink containing alcohol? 4 or more times a week   2.  How many drinks containing alcohol do you have on a typical day when you are drinking? 1 or 2   3.  How often do you have five or more drinks on one occasion? Never   4.  How often during the last year have you found that you were not able to stop drinking once you had started? Never   5.  How often during the last year have you failed to do what was normally expected of you because of drinking? Never   6.  How often during the last year have you needed a first drink in the morning to get yourself going after a heavy drinking session? Never   7.  How often during the last year have you had a feeling of guilt or remorse after drinking? Never   8.  How often during the last year have you been unable to remember what happened the night before because of your drinking? Never    9.  Have you or someone else been injured because of your drinking? No   10. Has a relative, friend, doctor or other health care worker been concerned about your drinking or suggested you cut down? No   TOTAL SCORE 4     Last PSA:   PSA   Date Value Ref Range Status   04/25/2019 0.72 0 - 4 ug/L Final     Comment:     Assay Method:  Chemiluminescence using Siemens Vista analyzer     Prostate Specific Antigen Screen   Date Value Ref Range Status   07/21/2022 0.70 0.00 - 4.00 ug/L Final     Reviewed orders with patient. Reviewed health maintenance and updated orders accordingly - yes    Reviewed and updated as needed this visit by clinical staff   Tobacco  Allergies  Meds   Med Hx  Surg Hx  Fam Hx          Reviewed and updated as needed this visit by Provider   Tobacco  Allergies    Med Hx  Surg Hx  Fam Hx           Review of Systems   Constitutional:  Negative for chills and fever.   HENT:  Negative for congestion, ear pain, hearing loss and sore throat.    Eyes:  Negative for pain and visual disturbance.   Respiratory:  Negative for cough and shortness of breath.    Cardiovascular:  Positive for chest pain. Negative for palpitations and peripheral edema.   Gastrointestinal:  Negative for abdominal pain, constipation, diarrhea, heartburn, hematochezia and nausea.   Genitourinary:  Negative for dysuria, frequency, genital sores, hematuria and urgency.   Musculoskeletal:  Negative for arthralgias, joint swelling and myalgias.   Skin:  Positive for rash.   Neurological:  Negative for dizziness, weakness, headaches and paresthesias.   Psychiatric/Behavioral:  Negative for mood changes. The patient is not nervous/anxious.      /76 (BP Location: Right arm, Patient Position: Sitting, Cuff Size: Adult Large)   Pulse 64   Temp 98.8  F (37.1  C) (Oral)   Resp 12   Ht 1.829 m (6')   Wt 123.8 kg (273 lb)   SpO2 97%   BMI 37.03 kg/m      Physical Exam  GENERAL: healthy, alert and no distress  HEAD:  Normocephalic, atraumatic.   EYES: PERRL. Normal conjunctivae, sclera.   ENT: Normal EAC and TMs bilaterally. Normal oropharynx.   NECK: Supple. No lymphadenopathy appreciated. Trachea midline. Thyroid not enlarged, not TTP.  RESP: lungs clear to auscultation - no rales, rhonchi or wheezes  CV: regular rate and rhythm, normal S1 S2, no murmur, click, rub or gallop. No carotid bruits. Trace peripheral swelling noted.   ABDOMEN: soft, no TTP x4 quadrants. No hepatomegaly or masses appreciated. Normal appearing umbilicus. BS normactive.  MSK: no gross musculoskeletal defects noted.  SKIN:   EXT: Warm and well perfused.   NEURO: CNII-XII grossly intact. No focal deficits.  PSYCH: Groomed, dressed appropriately for weather. Normal mood with consistent affect.     ASSESSMENT/PLAN:   (Z00.00) Routine general medical examination at a health care facility  (primary encounter diagnosis)  (Z12.5) Screening for prostate cancer  Doing well overall. Fasting thus will obtain lipid today. UTD on CRC screening. Due for PSA, ordered. Discussed vaccination recommendations.  Plan: PSA, screen, INFLUENZA VACCINE 18-64Y (FLUBLOK)    (R73.03) Prediabetes  Plan: Hemoglobin A1c    (N18.31) Chronic gout of left foot, unspecified cause  Follows with Nephrology. No gout flares since initiation of allopurinol which is prescribed by Nephrologist. Of note, is on hydrochlorothiazide for HTN (but as he is doing well without flares will continue for now). Requesting uric acid level, to send to Nephrologist after results return.   Plan: Uric acid, Renal panel (Alb, BUN, Ca, Cl, CO2,         Creat, Gluc, Phos, K, Na)    (N18.31) Chronic kidney disease, stage 3a (H)  (N27.0) Unilateral small kidney  Follows with Nephrology. Will obtain labs and send to Nephrology for review.   Plan: Albumin Random Urine Quantitative with Creat         Ratio, Hemoglobin A1c, Renal panel (Alb, BUN,         Ca, Cl, CO2, Creat, Gluc, Phos, K, Na)  Plan: Renal panel (Alb,  BUN, Ca, Cl, CO2, Creat,         Gluc, Phos, K, Na)    (E78.5) Hyperlipidemia with target LDL less than 130  On pravastatin, tolerating well. Will repeat lipid panel today.    (J45.40) Moderate persistent asthma without complication  ACT of 24. Stable. Follows with Allergy and Immunology who prescribes inhalers for patient.    (I10) Essential (primary) hypertension  (E66.01) Elevated BMI (H)  Well controlled on losartan, hydrochlorothiazide. Repeat BMP and microalbumin today. BMI likely contributing to both HTN and MARGARITO.    (B37.2) Candidal intertrigo  See image.   Plan: nystatin (MYCOSTATIN) 546237 UNIT/GM external         cream    (Q89.9) Umbilical abnormality  Itchy umbilicus. Trial nystatin BID for up to 4 weeks.    (Z87.09) Hx of pleurisy  Completely resolved. No recurrent symptoms and no anginal symptoms present.     COUNSELING:   Reviewed preventive health counseling, as reflected in patient instructions    BMI:   Estimated body mass index is 37.03 kg/m  as calculated from the following:    Height as of this encounter: 1.829 m (6').    Weight as of this encounter: 123.8 kg (273 lb).     He reports that he has never smoked. He has never used smokeless tobacco.          Tl Prabhakar MD  [facility]  9/12/2023

## 2023-10-23 ENCOUNTER — TRANSFERRED RECORDS (OUTPATIENT)
Dept: HEALTH INFORMATION MANAGEMENT | Facility: CLINIC | Age: 58
End: 2023-10-23
Payer: COMMERCIAL

## 2023-11-06 ENCOUNTER — TRANSFERRED RECORDS (OUTPATIENT)
Dept: HEALTH INFORMATION MANAGEMENT | Facility: CLINIC | Age: 58
End: 2023-11-06
Payer: COMMERCIAL

## 2023-11-18 DIAGNOSIS — E78.5 HYPERLIPIDEMIA WITH TARGET LDL LESS THAN 130: ICD-10-CM

## 2023-11-20 RX ORDER — PRAVASTATIN SODIUM 20 MG
20 TABLET ORAL DAILY
Qty: 90 TABLET | Refills: 0 | Status: SHIPPED | OUTPATIENT
Start: 2023-11-20 | End: 2024-02-12

## 2023-11-21 ENCOUNTER — TRANSFERRED RECORDS (OUTPATIENT)
Dept: HEALTH INFORMATION MANAGEMENT | Facility: CLINIC | Age: 58
End: 2023-11-21
Payer: COMMERCIAL

## 2024-02-10 DIAGNOSIS — E78.5 HYPERLIPIDEMIA WITH TARGET LDL LESS THAN 130: ICD-10-CM

## 2024-02-12 RX ORDER — PRAVASTATIN SODIUM 20 MG
20 TABLET ORAL DAILY
Qty: 90 TABLET | Refills: 0 | Status: SHIPPED | OUTPATIENT
Start: 2024-02-12 | End: 2024-05-14

## 2024-03-04 ENCOUNTER — TRANSFERRED RECORDS (OUTPATIENT)
Dept: HEALTH INFORMATION MANAGEMENT | Facility: CLINIC | Age: 59
End: 2024-03-04
Payer: COMMERCIAL

## 2024-03-13 NOTE — TELEPHONE ENCOUNTER
"They are requesting the meds be sent separate as the combo -   Losartan-hydrochlorothiazide is on backorder.      Requested Prescriptions   Pending Prescriptions Disp Refills     losartan (COZAAR) 100 MG tablet 90 tablet 0     Sig: Take 1 tablet (100 mg) by mouth daily       There is no refill protocol information for this order      Refused Prescriptions Disp Refills     losartan-hydrochlorothiazide (HYZAAR) 100-25 MG tablet 30 tablet 3     Sig: Take 1 tablet by mouth daily       Angiotensin-II Receptors Failed - 12/20/2019  8:52 AM        Failed - Normal serum creatinine on file in past 12 months     Recent Labs   Lab Test 04/25/19  0837   CR 1.48*             Passed - Last blood pressure under 140/90 in past 12 months     BP Readings from Last 3 Encounters:   12/11/19 130/78   04/30/19 138/88   02/19/19 (!) 142/100                 Passed - Recent (12 mo) or future (30 days) visit within the authorizing provider's specialty     Patient has had an office visit with the authorizing provider or a provider within the authorizing providers department within the previous 12 mos or has a future within next 30 days. See \"Patient Info\" tab in inbasket, or \"Choose Columns\" in Meds & Orders section of the refill encounter.              Passed - Medication is active on med list        Passed - Patient is age 18 or older        Passed - Normal serum potassium on file in past 12 months     Recent Labs   Lab Test 04/25/19  0837   POTASSIUM 4.3                    Routing refill request to provider for review/approval because:  bp elevated and creatinine off        " Grossly Intact

## 2024-05-14 DIAGNOSIS — E78.5 HYPERLIPIDEMIA WITH TARGET LDL LESS THAN 130: ICD-10-CM

## 2024-05-14 RX ORDER — PRAVASTATIN SODIUM 20 MG
20 TABLET ORAL DAILY
Qty: 90 TABLET | Refills: 1 | Status: SHIPPED | OUTPATIENT
Start: 2024-05-14

## 2024-05-22 ENCOUNTER — TRANSFERRED RECORDS (OUTPATIENT)
Dept: HEALTH INFORMATION MANAGEMENT | Facility: CLINIC | Age: 59
End: 2024-05-22
Payer: COMMERCIAL

## 2024-08-13 ENCOUNTER — PATIENT OUTREACH (OUTPATIENT)
Dept: CARE COORDINATION | Facility: CLINIC | Age: 59
End: 2024-08-13
Payer: COMMERCIAL

## 2024-08-19 DIAGNOSIS — E78.5 HYPERLIPIDEMIA WITH TARGET LDL LESS THAN 130: ICD-10-CM

## 2024-08-19 RX ORDER — PRAVASTATIN SODIUM 20 MG
20 TABLET ORAL DAILY
Qty: 90 TABLET | Refills: 1 | OUTPATIENT
Start: 2024-08-19

## 2024-11-10 DIAGNOSIS — E78.5 HYPERLIPIDEMIA WITH TARGET LDL LESS THAN 130: ICD-10-CM

## 2024-11-11 RX ORDER — PRAVASTATIN SODIUM 20 MG
20 TABLET ORAL DAILY
Qty: 90 TABLET | Refills: 0 | Status: SHIPPED | OUTPATIENT
Start: 2024-11-11

## 2024-11-12 DIAGNOSIS — E66.9 OBESITY, UNSPECIFIED: Primary | ICD-10-CM

## 2024-11-20 ENCOUNTER — TRANSFERRED RECORDS (OUTPATIENT)
Dept: HEALTH INFORMATION MANAGEMENT | Facility: CLINIC | Age: 59
End: 2024-11-20
Payer: COMMERCIAL

## 2024-11-21 SDOH — HEALTH STABILITY: PHYSICAL HEALTH: ON AVERAGE, HOW MANY MINUTES DO YOU ENGAGE IN EXERCISE AT THIS LEVEL?: 30 MIN

## 2024-11-21 SDOH — HEALTH STABILITY: PHYSICAL HEALTH: ON AVERAGE, HOW MANY DAYS PER WEEK DO YOU ENGAGE IN MODERATE TO STRENUOUS EXERCISE (LIKE A BRISK WALK)?: 3 DAYS

## 2024-11-21 ASSESSMENT — ASTHMA QUESTIONNAIRES
QUESTION_3 LAST FOUR WEEKS HOW OFTEN DID YOUR ASTHMA SYMPTOMS (WHEEZING, COUGHING, SHORTNESS OF BREATH, CHEST TIGHTNESS OR PAIN) WAKE YOU UP AT NIGHT OR EARLIER THAN USUAL IN THE MORNING: NOT AT ALL
ACT_TOTALSCORE: 24
QUESTION_2 LAST FOUR WEEKS HOW OFTEN HAVE YOU HAD SHORTNESS OF BREATH: NOT AT ALL
QUESTION_1 LAST FOUR WEEKS HOW MUCH OF THE TIME DID YOUR ASTHMA KEEP YOU FROM GETTING AS MUCH DONE AT WORK, SCHOOL OR AT HOME: NONE OF THE TIME
QUESTION_5 LAST FOUR WEEKS HOW WOULD YOU RATE YOUR ASTHMA CONTROL: COMPLETELY CONTROLLED
ACT_TOTALSCORE: 24
QUESTION_4 LAST FOUR WEEKS HOW OFTEN HAVE YOU USED YOUR RESCUE INHALER OR NEBULIZER MEDICATION (SUCH AS ALBUTEROL): ONCE A WEEK OR LESS

## 2024-11-21 ASSESSMENT — SOCIAL DETERMINANTS OF HEALTH (SDOH): HOW OFTEN DO YOU GET TOGETHER WITH FRIENDS OR RELATIVES?: ONCE A WEEK

## 2024-11-26 ENCOUNTER — OFFICE VISIT (OUTPATIENT)
Dept: FAMILY MEDICINE | Facility: CLINIC | Age: 59
End: 2024-11-26
Payer: COMMERCIAL

## 2024-11-26 VITALS
SYSTOLIC BLOOD PRESSURE: 129 MMHG | BODY MASS INDEX: 37.29 KG/M2 | HEIGHT: 72 IN | HEART RATE: 55 BPM | RESPIRATION RATE: 16 BRPM | OXYGEN SATURATION: 95 % | DIASTOLIC BLOOD PRESSURE: 77 MMHG | WEIGHT: 275.3 LBS | TEMPERATURE: 98.9 F

## 2024-11-26 DIAGNOSIS — N18.31 CHRONIC KIDNEY DISEASE, STAGE 3A (H): ICD-10-CM

## 2024-11-26 DIAGNOSIS — E66.01 MORBID OBESITY (H): ICD-10-CM

## 2024-11-26 DIAGNOSIS — E78.5 HYPERLIPIDEMIA WITH TARGET LDL LESS THAN 130: ICD-10-CM

## 2024-11-26 DIAGNOSIS — Z00.00 ROUTINE GENERAL MEDICAL EXAMINATION AT A HEALTH CARE FACILITY: Primary | ICD-10-CM

## 2024-11-26 DIAGNOSIS — M10.9 GOUT, UNSPECIFIED CAUSE, UNSPECIFIED CHRONICITY, UNSPECIFIED SITE: ICD-10-CM

## 2024-11-26 DIAGNOSIS — I10 ESSENTIAL (PRIMARY) HYPERTENSION: ICD-10-CM

## 2024-11-26 DIAGNOSIS — J45.40 MODERATE PERSISTENT ASTHMA WITHOUT COMPLICATION: ICD-10-CM

## 2024-11-26 DIAGNOSIS — R73.03 PREDIABETES: ICD-10-CM

## 2024-11-26 LAB
ALT SERPL W P-5'-P-CCNC: 23 U/L (ref 0–70)
ANION GAP SERPL CALCULATED.3IONS-SCNC: 13 MMOL/L (ref 7–15)
AST SERPL W P-5'-P-CCNC: 24 U/L (ref 0–45)
BUN SERPL-MCNC: 22.4 MG/DL (ref 6–20)
CALCIUM SERPL-MCNC: 10 MG/DL (ref 8.8–10.4)
CHLORIDE SERPL-SCNC: 99 MMOL/L (ref 98–107)
CHOLEST SERPL-MCNC: 219 MG/DL
CREAT SERPL-MCNC: 1.49 MG/DL (ref 0.67–1.17)
CREAT UR-MCNC: 35.2 MG/DL
EGFRCR SERPLBLD CKD-EPI 2021: 54 ML/MIN/1.73M2
EST. AVERAGE GLUCOSE BLD GHB EST-MCNC: 123 MG/DL
FASTING STATUS PATIENT QL REPORTED: YES
GLUCOSE SERPL-MCNC: 106 MG/DL (ref 70–99)
GLUCOSE SERPL-MCNC: 106 MG/DL (ref 70–99)
HBA1C MFR BLD: 5.9 % (ref 0–5.6)
HCO3 SERPL-SCNC: 24 MMOL/L (ref 22–29)
HDLC SERPL-MCNC: 33 MG/DL
HGB BLD-MCNC: 14.1 G/DL (ref 13.3–17.7)
INSULIN SERPL-ACNC: 20.4 UU/ML (ref 2.6–24.9)
LDLC SERPL CALC-MCNC: 159 MG/DL
MICROALBUMIN UR-MCNC: <12 MG/L
MICROALBUMIN/CREAT UR: NORMAL MG/G{CREAT}
NONHDLC SERPL-MCNC: 186 MG/DL
PLATELET # BLD AUTO: 319 10E3/UL (ref 150–450)
POTASSIUM SERPL-SCNC: 4.3 MMOL/L (ref 3.4–5.3)
SODIUM SERPL-SCNC: 136 MMOL/L (ref 135–145)
TRIGL SERPL-MCNC: 134 MG/DL
TSH SERPL DL<=0.005 MIU/L-ACNC: 2.87 UIU/ML (ref 0.3–4.2)
URATE SERPL-MCNC: 5.8 MG/DL (ref 3.4–7)
VIT B12 SERPL-MCNC: 691 PG/ML (ref 232–1245)
VIT D+METAB SERPL-MCNC: 22 NG/ML (ref 20–50)

## 2024-11-26 PROCEDURE — 99396 PREV VISIT EST AGE 40-64: CPT | Mod: 25 | Performed by: STUDENT IN AN ORGANIZED HEALTH CARE EDUCATION/TRAINING PROGRAM

## 2024-11-26 PROCEDURE — 85049 AUTOMATED PLATELET COUNT: CPT | Performed by: STUDENT IN AN ORGANIZED HEALTH CARE EDUCATION/TRAINING PROGRAM

## 2024-11-26 PROCEDURE — 90480 ADMN SARSCOV2 VAC 1/ONLY CMP: CPT | Performed by: STUDENT IN AN ORGANIZED HEALTH CARE EDUCATION/TRAINING PROGRAM

## 2024-11-26 PROCEDURE — 84460 ALANINE AMINO (ALT) (SGPT): CPT | Performed by: STUDENT IN AN ORGANIZED HEALTH CARE EDUCATION/TRAINING PROGRAM

## 2024-11-26 PROCEDURE — 84443 ASSAY THYROID STIM HORMONE: CPT | Performed by: STUDENT IN AN ORGANIZED HEALTH CARE EDUCATION/TRAINING PROGRAM

## 2024-11-26 PROCEDURE — 83525 ASSAY OF INSULIN: CPT | Performed by: STUDENT IN AN ORGANIZED HEALTH CARE EDUCATION/TRAINING PROGRAM

## 2024-11-26 PROCEDURE — 83036 HEMOGLOBIN GLYCOSYLATED A1C: CPT | Performed by: STUDENT IN AN ORGANIZED HEALTH CARE EDUCATION/TRAINING PROGRAM

## 2024-11-26 PROCEDURE — 82607 VITAMIN B-12: CPT | Performed by: STUDENT IN AN ORGANIZED HEALTH CARE EDUCATION/TRAINING PROGRAM

## 2024-11-26 PROCEDURE — 36415 COLL VENOUS BLD VENIPUNCTURE: CPT | Performed by: STUDENT IN AN ORGANIZED HEALTH CARE EDUCATION/TRAINING PROGRAM

## 2024-11-26 PROCEDURE — 90673 RIV3 VACCINE NO PRESERV IM: CPT | Performed by: STUDENT IN AN ORGANIZED HEALTH CARE EDUCATION/TRAINING PROGRAM

## 2024-11-26 PROCEDURE — 82306 VITAMIN D 25 HYDROXY: CPT | Performed by: STUDENT IN AN ORGANIZED HEALTH CARE EDUCATION/TRAINING PROGRAM

## 2024-11-26 PROCEDURE — 80048 BASIC METABOLIC PNL TOTAL CA: CPT | Performed by: STUDENT IN AN ORGANIZED HEALTH CARE EDUCATION/TRAINING PROGRAM

## 2024-11-26 PROCEDURE — 84550 ASSAY OF BLOOD/URIC ACID: CPT | Performed by: STUDENT IN AN ORGANIZED HEALTH CARE EDUCATION/TRAINING PROGRAM

## 2024-11-26 PROCEDURE — 91320 SARSCV2 VAC 30MCG TRS-SUC IM: CPT | Performed by: STUDENT IN AN ORGANIZED HEALTH CARE EDUCATION/TRAINING PROGRAM

## 2024-11-26 PROCEDURE — 82570 ASSAY OF URINE CREATININE: CPT | Performed by: STUDENT IN AN ORGANIZED HEALTH CARE EDUCATION/TRAINING PROGRAM

## 2024-11-26 PROCEDURE — 85018 HEMOGLOBIN: CPT | Performed by: STUDENT IN AN ORGANIZED HEALTH CARE EDUCATION/TRAINING PROGRAM

## 2024-11-26 PROCEDURE — 99214 OFFICE O/P EST MOD 30 MIN: CPT | Mod: 25 | Performed by: STUDENT IN AN ORGANIZED HEALTH CARE EDUCATION/TRAINING PROGRAM

## 2024-11-26 PROCEDURE — 80061 LIPID PANEL: CPT | Performed by: STUDENT IN AN ORGANIZED HEALTH CARE EDUCATION/TRAINING PROGRAM

## 2024-11-26 PROCEDURE — 82043 UR ALBUMIN QUANTITATIVE: CPT | Performed by: STUDENT IN AN ORGANIZED HEALTH CARE EDUCATION/TRAINING PROGRAM

## 2024-11-26 PROCEDURE — 90471 IMMUNIZATION ADMIN: CPT | Performed by: STUDENT IN AN ORGANIZED HEALTH CARE EDUCATION/TRAINING PROGRAM

## 2024-11-26 PROCEDURE — 84450 TRANSFERASE (AST) (SGOT): CPT | Performed by: STUDENT IN AN ORGANIZED HEALTH CARE EDUCATION/TRAINING PROGRAM

## 2024-11-26 RX ORDER — ONDANSETRON 4 MG/1
TABLET, ORALLY DISINTEGRATING ORAL
COMMUNITY
Start: 2024-11-12

## 2024-11-26 ASSESSMENT — PAIN SCALES - GENERAL: PAINLEVEL_OUTOF10: NO PAIN (0)

## 2024-11-26 NOTE — PATIENT INSTRUCTIONS
Patient Education   Preventive Care Advice   This is general advice given by our system to help you stay healthy. However, your care team may have specific advice just for you. Please talk to your care team about your preventive care needs.  Nutrition  Eat 5 or more servings of fruits and vegetables each day.  Try wheat bread, brown rice and whole grain pasta (instead of white bread, rice, and pasta).  Get enough calcium and vitamin D. Check the label on foods and aim for 100% of the RDA (recommended daily allowance).  Lifestyle  Exercise at least 150 minutes each week  (30 minutes a day, 5 days a week).  Do muscle strengthening activities 2 days a week. These help control your weight and prevent disease.  No smoking.  Wear sunscreen to prevent skin cancer.  Have a dental exam and cleaning every 6 months.  Yearly exams  See your health care team every year to talk about:  Any changes in your health.  Any medicines your care team has prescribed.  Preventive care, family planning, and ways to prevent chronic diseases.  Shots (vaccines)   HPV shots (up to age 26), if you've never had them before.  Hepatitis B shots (up to age 59), if you've never had them before.  COVID-19 shot: Get this shot when it's due.  Flu shot: Get a flu shot every year.  Tetanus shot: Get a tetanus shot every 10 years.  Pneumococcal, hepatitis A, and RSV shots: Ask your care team if you need these based on your risk.  Shingles shot (for age 50 and up)  General health tests  Diabetes screening:  Starting at age 35, Get screened for diabetes at least every 3 years.  If you are younger than age 35, ask your care team if you should be screened for diabetes.  Cholesterol test: At age 39, start having a cholesterol test every 5 years, or more often if advised.  Bone density scan (DEXA): At age 50, ask your care team if you should have this scan for osteoporosis (brittle bones).  Hepatitis C: Get tested at least once in your life.  STIs (sexually  transmitted infections)  Before age 24: Ask your care team if you should be screened for STIs.  After age 24: Get screened for STIs if you're at risk. You are at risk for STIs (including HIV) if:  You are sexually active with more than one person.  You don't use condoms every time.  You or a partner was diagnosed with a sexually transmitted infection.  If you are at risk for HIV, ask about PrEP medicine to prevent HIV.  Get tested for HIV at least once in your life, whether you are at risk for HIV or not.  Cancer screening tests  Cervical cancer screening: If you have a cervix, begin getting regular cervical cancer screening tests starting at age 21.  Breast cancer scan (mammogram): If you've ever had breasts, begin having regular mammograms starting at age 40. This is a scan to check for breast cancer.  Colon cancer screening: It is important to start screening for colon cancer at age 45.  Have a colonoscopy test every 10 years (or more often if you're at risk) Or, ask your provider about stool tests like a FIT test every year or Cologuard test every 3 years.  To learn more about your testing options, visit:   .  For help making a decision, visit:   https://bit.ly/mc51866.  Prostate cancer screening test: If you have a prostate, ask your care team if a prostate cancer screening test (PSA) at age 55 is right for you.  Lung cancer screening: If you are a current or former smoker ages 50 to 80, ask your care team if ongoing lung cancer screenings are right for you.  For informational purposes only. Not to replace the advice of your health care provider. Copyright   2023 [facility]. All rights reserved. Clinically reviewed by the [facility]. [employer] [id number] - REV 01/24.  Substance Use Disorder: Care Instructions  Overview     You can improve your life and health by stopping your use of alcohol or drugs. When you don't drink or use drugs, you may feel and sleep better. You may  get along better with your family, friends, and coworkers. There are medicines and programs that can help with substance use disorder.  How can you care for yourself at home?  Here are some ways to help you stay sober and prevent relapse.  If you have been given medicine to help keep you sober or reduce your cravings, be sure to take it exactly as prescribed.  Talk to your doctor about programs that can help you stop using drugs or drinking alcohol.  Do not keep alcohol or drugs in your home.  Plan ahead. Think about what you'll say if other people ask you to drink or use drugs. Try not to spend time with people who drink or use drugs.  Use the time and money spent on drinking or drugs to do something that's important to you.  Preventing a relapse  Have a plan to deal with relapse. Learn to recognize changes in your thinking that lead you to drink or use drugs. Get help before you start to drink or use drugs again.  Try to stay away from situations, friends, or places that may lead you to drink or use drugs.  If you feel the need to drink alcohol or use drugs again, seek help right away. Call a trusted friend or family member. Some people get support from organizations such as Narcotics Anonymous or [employer] or from treatment facilities.  If you relapse, get help as soon as you can. Some people make a plan with another person that outlines what they want that person to do for them if they relapse. The plan usually includes how to handle the relapse and who to notify in case of relapse.  Don't give up. Remember that a relapse doesn't mean that you have failed. Use the experience to learn the triggers that lead you to drink or use drugs. Then quit again. Recovery is a lifelong process. Many people have several relapses before they are able to quit for good.  Follow-up care is a key part of your treatment and safety. Be sure to make and go to all appointments, and call your doctor if you are having problems. It's  "also a good idea to know your test results and keep a list of the medicines you take.  When should you call for help?   Call 911  anytime you think you may need emergency care. For example, call if you or someone else:    Has overdosed or has withdrawal signs. Be sure to tell the emergency workers that you are or someone else is using or trying to quit using drugs. Overdose or withdrawal signs may include:  Losing consciousness.  Seizure.  Seeing or hearing things that aren't there (hallucinations).     Is thinking or talking about suicide or harming others.   Where to get help 24 hours a day, 7 days a week   If you or someone you know talks about suicide, self-harm, a mental health crisis, a substance use crisis, or any other kind of emotional distress, get help right away. You can:    Call the Suicide and Crisis Lifeline at 988.     Call 3-998-977-TALK (1-566.413.8137).     Text HOME to 550030 to access the Crisis Text Line.   Consider saving these numbers in your phone.  Go to [employer] for more information or to chat online.  Call your doctor now or seek immediate medical care if:    You are having withdrawal symptoms. These may include nausea or vomiting, sweating, shakiness, and anxiety.   Watch closely for changes in your health, and be sure to contact your doctor if:    You have a relapse.     You need more help or support to stop.   Where can you learn more?  Go to https://www.[employer].net/patiented  Enter H573 in the search box to learn more about \"Substance Use Disorder: Care Instructions.\"  Current as of: November 15, 2023  Content Version: 14.2 2024 [facility] [employer].   Care instructions adapted under license by your healthcare professional. If you have questions about a medical condition or this instruction, always ask your healthcare professional. [employer] disclaims any warranty or liability for your use of this information.       "

## 2024-11-26 NOTE — PROGRESS NOTES
"Preventive Care Visit  [facility]  Tl Prabhakar MD, Family Practice  Nov 26, 2024    Assessment & Plan     Routine general medical examination at a health care facility  Doing well overall. Due for lipid, ordered. Not yet due for repeat PSA level, repeat next year. CRC screening UTD. Discussed vaccination recommendations.    Hyperlipidemia with target LDL less than 130  Stopped pravastatin 2 months ago due to myalgias. Plan to repeat lipid today.    Moderate persistent asthma without complication  Following with Allergist who is managing with inhalers.    Gout, unspecified cause, unspecified chronicity, unspecified site  Following with Nephrology and managed with allopurinol through them. Of note, is on hydrochlorothiazide for HTN (but as he is doing well without flares will continue for now).   - Uric acid    Prediabetes  Repeat A1c of 5.9 today and stable.    Essential (primary) hypertension  Well controlled on 100mg losartan and 25mg hydrochlorothiazide daily. Plan to repeat BMP. Ok to refill for 1 yr.     Chronic kidney disease, stage 3a (H)  - Albumin Random Urine Quantitative with Creat Ratio    Morbid obesity (H)  Likely contributing to HTN and prediabetes as above.     BMI  Estimated body mass index is 37.86 kg/m  as calculated from the following:    Height as of this encounter: 1.816 m (5' 11.5\").    Weight as of this encounter: 124.9 kg (275 lb 4.8 oz).     Counseling  Appropriate preventive services were discussed with this patient.  Checklist reviewing preventive services available has been given to the patient.    Follow up in 1 yr, earlier as needed    Tl Prabhakar MD  [facility]  11/26/2024    Rajeev Cao is a 59 year old, presenting for the following:  Physical        11/26/2024     8:27 AM   Additional Questions   Roomed by Tereza SHAH CMA   Accompanied by NA         11/26/2024     8:28 AM   Patient Reported Additional Medications   Patient reports " taking the following new medications GLP 1        HPI    Weight gain  Was 250-254 in March to April of 2024 but then stacked on the lbs.   Clothes started not fitting anymore.   Just recently started the GLP1 one week ago. Tolerating it fine.   Was prescribed zofran for nausea just in case.  Moving more recently.     Atrophic kidney  Gout  Follows with Nephrology for this and is on allopurinol which is managed through them.     Asthma and allergies  Undergoing allergy shots. Has 11 shots to go before up to full level  Breo maintenance and albuterol prn.  Trigger is cold air.     HTN  Is taking the losartan 100mg daily and 25mg hydrochlorothiazide daily.  No issues at all.     MARGARITO  Still on the CPAP machine, scheduled for January for CPAP follow up (3 year recheck)    HLD  Is no longer on the pravastatin due to severe cramping which was ongoing for about 6 weeks.   Stopped on September 23rd.   Traditionally has ramped up slowly.     Health Care Directive  Patient does not have a Health Care Directive: Discussed advance care planning with patient; however, patient declined at this time.      11/21/2024   General Health   How would you rate your overall physical health? Good   Feel stress (tense, anxious, or unable to sleep) Only a little      (!) STRESS CONCERN      11/21/2024   Nutrition   Three or more servings of calcium each day? Yes   Diet: Low salt    Low fat/cholesterol   How many servings of fruit and vegetables per day? (!) 2-3   How many sweetened beverages each day? 0-1       Multiple values from one day are sorted in reverse-chronological order         11/21/2024   Exercise   Days per week of moderate/strenous exercise 3 days   Average minutes spent exercising at this level 30 min            11/21/2024   Social Factors   Frequency of gathering with friends or relatives Once a week   Worry food won't last until get money to buy more No   Food not last or not have enough money for food? No   Do you have  housing? (Housing is defined as stable permanent housing and does not include staying ouside in a car, in a tent, in an abandoned building, in an overnight shelter, or couch-surfing.) Yes   Are you worried about losing your housing? No   Lack of transportation? No   Unable to get utilities (heat,electricity)? Yes   Want help with housing or utility concern? No      (!) FINANCIAL RESOURCE STRAIN CONCERN      11/21/2024   Fall Risk   Fallen 2 or more times in the past year? No    Trouble with walking or balance? No        Patient-reported          11/21/2024   Dental   Dentist two times every year? Yes            11/21/2024   TB Screening   Were you born outside of the [place]? Yes            Today's PHQ-2 Score:       11/26/2024     8:00 AM   PHQ-2 ( 1999 [employer])   Q1: Little interest or pleasure in doing things 0    Q2: Feeling down, depressed or hopeless 0    PHQ-2 Score 0    Q1: Little interest or pleasure in doing things Not at all   Q2: Feeling down, depressed or hopeless Not at all   PHQ-2 Score 0       Patient-reported           11/21/2024   Substance Use   Alcohol more than 3/day or more than 7/wk No   Do you use any other substances recreationally? (!) CANNABIS PRODUCTS        Social History     Tobacco Use    Smoking status: Never     Passive exposure: Never    Smokeless tobacco: Never   Vaping Use    Vaping status: Never Used   Substance Use Topics    Alcohol use: Yes     Alcohol/week: 3.3 - 5.0 standard drinks of alcohol     Types: 4 - 6 Standard drinks or equivalent per week     Comment: 7-8 beers per week    Drug use: No         11/21/2024   STI Screening   New sexual partner(s) since last STI/HIV test? No      Last PSA:   PSA   Date Value Ref Range Status   04/25/2019 0.72 0 - 4 ug/L Final     Comment:     Assay Method:  Chemiluminescence using Siemens Vista analyzer     Prostate Specific Antigen Screen   Date Value Ref Range Status   09/12/2023 0.79 0.00 - 3.50 ng/mL Final   07/21/2022 0.70 0.00 - 4.00  "ug/L Final     ASCVD Risk   The 10-year ASCVD risk score (Jose PRITCHARD, et al., 2019) is: 7.5%    Values used to calculate the score:      Age: 59 years      Sex: Male      Is Non- : No      Diabetic: No      Tobacco smoker: No      Systolic Blood Pressure: 129 mmHg      Is BP treated: Yes      HDL Cholesterol: 42 mg/dL      Total Cholesterol: 152 mg/dL    Reviewed and updated as needed this visit by Provider   Tobacco  Allergies    Med Hx  Surg Hx  Fam Hx               Objective    Exam  /77 (BP Location: Right arm, Patient Position: Sitting, Cuff Size: Adult Large)   Pulse 55   Temp 98.9  F (37.2  C) (Oral)   Resp 16   Ht 1.816 m (5' 11.5\")   Wt 124.9 kg (275 lb 4.8 oz)   SpO2 95%   BMI 37.86 kg/m     Estimated body mass index is 37.86 kg/m  as calculated from the following:    Height as of this encounter: 1.816 m (5' 11.5\").    Weight as of this encounter: 124.9 kg (275 lb 4.8 oz).    Physical Exam  GENERAL: healthy, alert and no distress  HEAD: Normocephalic, atraumatic.   EYES: PERRL. Normal conjunctivae, sclera.   ENT: Normal EAC and TMs bilaterally. Normal oropharynx.   NECK: Supple. No lymphadenopathy appreciated. Trachea midline. Thyroid not enlarged, not TTP.  RESP: lungs clear to auscultation - no rales, rhonchi or wheezes  CV: regular rate and rhythm, normal S1 S2, no murmur, click, rub or gallop.  Trace peripheral edema bilaterally up to mid-shins.  ABDOMEN: soft, no TTP x4 quadrants. No hepatomegaly or masses appreciated. BS normactive.  MSK: no gross musculoskeletal defects noted.  SKIN: no suspicious lesions or rashes.  EXT: Warm and well perfused.   NEURO: CNII-XII grossly intact. No focal deficits.  PSYCH: Groomed, dressed appropriately for weather. Normal mood with consistent affect.     Signed Electronically by: Tl Prabhakar MD    "

## 2024-12-09 ENCOUNTER — TRANSFERRED RECORDS (OUTPATIENT)
Dept: HEALTH INFORMATION MANAGEMENT | Facility: CLINIC | Age: 59
End: 2024-12-09
Payer: COMMERCIAL

## 2025-01-03 ENCOUNTER — OFFICE VISIT (OUTPATIENT)
Dept: FAMILY MEDICINE | Facility: CLINIC | Age: 60
End: 2025-01-03
Payer: COMMERCIAL

## 2025-01-03 VITALS
OXYGEN SATURATION: 98 % | SYSTOLIC BLOOD PRESSURE: 122 MMHG | DIASTOLIC BLOOD PRESSURE: 85 MMHG | WEIGHT: 272 LBS | HEART RATE: 69 BPM | RESPIRATION RATE: 12 BRPM | HEIGHT: 72 IN | TEMPERATURE: 98.1 F | BODY MASS INDEX: 36.84 KG/M2

## 2025-01-03 DIAGNOSIS — E66.01 MORBID OBESITY (H): ICD-10-CM

## 2025-01-03 DIAGNOSIS — I10 ESSENTIAL (PRIMARY) HYPERTENSION: ICD-10-CM

## 2025-01-03 DIAGNOSIS — J45.40 MODERATE PERSISTENT ASTHMA WITHOUT COMPLICATION: ICD-10-CM

## 2025-01-03 DIAGNOSIS — Z01.818 PREOP GENERAL PHYSICAL EXAM: Primary | ICD-10-CM

## 2025-01-03 DIAGNOSIS — M54.12 CERVICAL RADICULOPATHY: ICD-10-CM

## 2025-01-03 DIAGNOSIS — E78.5 HYPERLIPIDEMIA WITH TARGET LDL LESS THAN 130: ICD-10-CM

## 2025-01-03 DIAGNOSIS — N18.31 CHRONIC KIDNEY DISEASE, STAGE 3A (H): ICD-10-CM

## 2025-01-03 LAB
ANION GAP SERPL CALCULATED.3IONS-SCNC: 12 MMOL/L (ref 7–15)
BUN SERPL-MCNC: 19.9 MG/DL (ref 6–20)
CALCIUM SERPL-MCNC: 10 MG/DL (ref 8.8–10.4)
CHLORIDE SERPL-SCNC: 99 MMOL/L (ref 98–107)
CREAT SERPL-MCNC: 1.49 MG/DL (ref 0.67–1.17)
EGFRCR SERPLBLD CKD-EPI 2021: 54 ML/MIN/1.73M2
ERYTHROCYTE [DISTWIDTH] IN BLOOD BY AUTOMATED COUNT: 13.6 % (ref 10–15)
GLUCOSE SERPL-MCNC: 107 MG/DL (ref 70–99)
HCO3 SERPL-SCNC: 27 MMOL/L (ref 22–29)
HCT VFR BLD AUTO: 41 % (ref 40–53)
HGB BLD-MCNC: 13.8 G/DL (ref 13.3–17.7)
MCH RBC QN AUTO: 29.9 PG (ref 26.5–33)
MCHC RBC AUTO-ENTMCNC: 33.7 G/DL (ref 31.5–36.5)
MCV RBC AUTO: 89 FL (ref 78–100)
PLATELET # BLD AUTO: 301 10E3/UL (ref 150–450)
POTASSIUM SERPL-SCNC: 4.5 MMOL/L (ref 3.4–5.3)
RBC # BLD AUTO: 4.61 10E6/UL (ref 4.4–5.9)
SODIUM SERPL-SCNC: 138 MMOL/L (ref 135–145)
WBC # BLD AUTO: 8.6 10E3/UL (ref 4–11)

## 2025-01-03 PROCEDURE — 36415 COLL VENOUS BLD VENIPUNCTURE: CPT | Performed by: NURSE PRACTITIONER

## 2025-01-03 PROCEDURE — 85027 COMPLETE CBC AUTOMATED: CPT | Performed by: NURSE PRACTITIONER

## 2025-01-03 PROCEDURE — 80048 BASIC METABOLIC PNL TOTAL CA: CPT | Performed by: NURSE PRACTITIONER

## 2025-01-03 PROCEDURE — 99214 OFFICE O/P EST MOD 30 MIN: CPT | Performed by: NURSE PRACTITIONER

## 2025-01-03 PROCEDURE — G2211 COMPLEX E/M VISIT ADD ON: HCPCS | Performed by: NURSE PRACTITIONER

## 2025-01-03 PROCEDURE — 93000 ELECTROCARDIOGRAM COMPLETE: CPT | Performed by: NURSE PRACTITIONER

## 2025-01-03 ASSESSMENT — PAIN SCALES - GENERAL: PAINLEVEL_OUTOF10: NO PAIN (0)

## 2025-01-03 NOTE — PATIENT INSTRUCTIONS
How to Take Your Medication Before Surgery  Preoperative Medication Instructions   Antiplatelet or Anticoagulation Medication Instructions   - Patient is on no antiplatelet or anticoagulation medications.    Additional Medication Instructions   - ACE/ARB/ARNI (lisinopril, enalapril, losartan, valsartan, olmesartan, sacubritril/valsartan) : DO NOT TAKE on day of surgery (minimum 11 hours for general anesthesia).   - GLP-1 Injectable (exenitide, liraglutide, semaglutide, dulaglutide, etc.): DO NOT TAKE 7 days before surgery        Patient Education   Preparing for Your Surgery  For Adults  Getting started  In most cases, a nurse will call to review your health history and instructions. They will give you an arrival time based on your scheduled surgery time. Please be ready to share:  Your doctor's clinic name and phone number  Your medical, surgical, and anesthesia history  A list of allergies and sensitivities  A list of medicines, including herbal treatments and over-the-counter drugs  Whether the patient has a legal guardian (ask how to send us the papers in advance)  Note: You may not receive a call if you were seen at our PAC (Preoperative Assessment Center).  Please tell us if you're pregnant--or if there's any chance you might be pregnant. Some surgeries may injure a fetus (unborn baby), so they require a pregnancy test. Surgeries that are safe for a fetus don't always need a test, and you can choose whether to have one.   Preparing for surgery  Within 10 to 30 days of surgery: Have a pre-op exam (sometimes called an H&P, or History and Physical). This can be done at a clinic or pre-operative center.  If you're having a , you may not need this exam. Talk to your care team.  At your pre-op exam, talk to your care team about all medicines you take. (This includes CBD oil and any drugs, such as THC, marijuana, and other forms of cannabis.) If you need to stop any medicine before surgery, ask when to start  taking it again.  This is for your safety. Many medicines and drugs can make you bleed too much during surgery. Some change how well surgery (anesthesia) drugs work.  Call your insurance company to let them know you're having surgery. (If you don't have insurance, call [phone].)  Call your clinic if there's any change in your health. This includes a scrape or scratch near the surgery site, or any signs of a cold (sore throat, runny nose, cough, rash, fever).  Eating and drinking guidelines  For your safety: Unless your surgeon tells you otherwise, follow the guidelines below.  Eat and drink as normal until 8 hours before you arrive for surgery. After that, no food or milk. You can spit out gum when you arrive.  Drink clear liquids until 2 hours before you arrive. These are liquids you can see through, like water, Gatorade, and Propel Water. They also include plain black coffee and tea (no cream or milk).  No alcohol for 24 hours before you arrive. The night before surgery, stop any drinks that contain THC.  If your care team tells you to take medicine on the morning of surgery, it's okay to take it with a sip of water. No other medicines or drugs are allowed (including CBD oil)--follow your care team's instructions.  If you have questions the day of surgery, call your hospital or surgery center.   Preventing infection  Shower or bathe the night before and the morning of surgery. Follow the instructions your clinic gave you. (If no instructions, use regular soap.)  Don't shave or clip hair near your surgery site. We'll remove the hair if needed.  Don't smoke or vape the morning of surgery. No chewing tobacco for 6 hours before you arrive. A nicotine patch is okay. You may spit out nicotine gum when you arrive.  For some surgeries, the surgeon will tell you to fully quit smoking and nicotine.  We will make every effort to keep you safe from infection. We will:  Clean our hands often with soap and water (or an  alcohol-based hand rub).  Clean the skin at your surgery site with a special soap that kills germs.  Give you a special gown to keep you warm. (Cold raises the risk of infection.)  Wear hair covers, masks, gowns, and gloves during surgery.  Give antibiotic medicine, if prescribed. Not all surgeries need this medicine.  What to bring on the day of surgery  Photo ID and insurance card  Copy of your health care directive, if you have one  Glasses and hearing aids (bring cases)  You can't wear contacts during surgery  Inhaler and eye drops, if you use them (tell us about these when you arrive)  CPAP machine or breathing device, if you use them  A few personal items, if spending the night  If you have . . .  A pacemaker, ICD (cardiac defibrillator), or other implant: Bring the ID card.  An implanted stimulator: Bring the remote control.  A legal guardian: Bring a copy of the certified (court-stamped) guardianship papers.  Please remove any jewelry, including body piercings. Leave jewelry and other valuables at home.  If you're going home the day of surgery  You must have a responsible adult drive you home. They should stay with you overnight as well.  If you don't have someone to stay with you, and you aren't safe to go home alone, we may keep you overnight. Insurance often won't pay for this.  After surgery  If it's hard to control your pain or you need more pain medicine, please call your surgeon's office.  Questions?   If you have any questions for your care team, list them here:   ____________________________________________________________________________________________________________________________________________________________________________________________________________________________________________________________  For informational purposes only. Not to replace the advice of your health care provider. Copyright   2003, 2019 [facility]. All rights reserved. Clinically reviewed by Adilson  MD Emily. [employer] [id number] - REV 08/24.

## 2025-01-03 NOTE — PROGRESS NOTES
Preoperative Evaluation  [facility]  [address] [facility] [address]  Phone: [phone]  Primary Provider: Tl Prabhakar MD  Pre-op Performing Provider: DAISHA Sharma CNP  Dhruv 3, 2025             1/3/2025   Surgical Information   What procedure is being done? [id number]   Facility or Hospital where procedure/surgery [facility] be performed[facility]   Who is doing the procedure / surgery? Dr Allen   Date of surgery / procedure: 01/21/2025   Time of surgery / procedure: 9:00 am   Where do you plan to recover after surgery? at home with family     Fax number for surgical facility: [phone]    Assessment & Plan     The proposed surgical procedure is considered LOW risk.    Preop general physical exam  No further optimization required.  - CBC with platelets; Future  - Basic metabolic panel  (Ca, Cl, CO2, Creat, Gluc, K, Na, BUN); Future  - EKG 12-lead complete w/read - Clinics    Cervical radiculopathy  Procedure planned.    Moderate persistent asthma without complication  Stable, well controlled.    Hyperlipidemia with target LDL less than 130  Stopped pravastatin due to side effects despite decreased dose.  Will notify PCP.    Essential (primary) hypertension  Well controlled.  Asymptomatic.      Chronic kidney disease, stage 3a (H)  Repeat labs today.  Follows with nephrology.    Morbid obesity (H)  GLP1 started, managed by GI.    The longitudinal plan of care for the diagnosis(es)/condition(s) as documented were addressed during this visit. Due to the added complexity in care, I will continue to support Nash in the subsequent management and with ongoing continuity of care.          - No identified additional risk factors other than previously addressed    Preoperative Medication Instructions  Antiplatelet or Anticoagulation Medication Instructions   - Patient is on no antiplatelet or anticoagulation medications.    Additional Medication Instructions   -  ACE/ARB/ARNI (lisinopril, enalapril, losartan, valsartan, olmesartan, sacubritril/valsartan) : DO NOT TAKE on day of surgery (minimum 11 hours for general anesthesia).   - GLP-1 Injectable (exenitide, liraglutide, semaglutide, dulaglutide, etc.): DO NOT TAKE 7 days before surgery     Recommendation  Approval given to proceed with proposed procedure, without further diagnostic evaluation.    Rajeev Cao is a 59 year old, presenting for the following:  Pre-Op Exam          1/3/2025     8:05 AM   Additional Questions   Roomed by Jing VIDAL     HPI related to upcoming procedure: hx of severe R sided upper extremity radiating pain, numbness, tingling, and weakness.  Failed conservative management.  MRI showing multilevel cervical spondylosis as well as disc protrusion and R side and L side hemicord stenosis.        1/3/2025   Pre-Op Questionnaire   Have you ever had a heart attack or stroke? No   Have you ever had surgery on your heart or blood vessels, such as a stent placement, a coronary artery bypass, or surgery on an artery in your head, neck, heart, or legs? No   Do you have chest pain with activity? No   Do you have a history of heart failure? No   Do you currently have a cold, bronchitis or symptoms of other infection? No   Do you have a cough, shortness of breath, or wheezing? No   Do you or anyone in your family have previous history of blood clots? No   Do you or does anyone in your family have a serious bleeding problem such as prolonged bleeding following surgeries or cuts? No   Have you ever had problems with anemia or been told to take iron pills? No   Have you had any abnormal blood loss such as black, tarry or bloody stools? No   Have you ever had a blood transfusion? No   Are you willing to have a blood transfusion if it is medically needed before, during, or after your surgery? Yes   Have you or any of your relatives ever had problems with anesthesia? No   Do you have sleep apnea, excessive  snoring or daytime drowsiness? (!) YES   Do you have a CPAP machine? Yes   Do you have any artifical heart valves or other implanted medical devices like a pacemaker, defibrillator, or continuous glucose monitor? No   Do you have artificial joints? No   Are you allergic to latex? No     Health Care Directive  Patient does not have a Health Care Directive:     Preoperative Review of    reviewed - previous prescription noted.      Status of Chronic Conditions:  ASTHMA - Patient has a longstanding history of moderate-severe Asthma . Patient has been doing well overall noting NO SYMPTOMS and continues on medication regimen consisting of breo without adverse reactions or side effects.     Patient Active Problem List    Diagnosis Date Noted    Morbid obesity (H) 11/26/2024     Priority: Medium    Prediabetes 08/25/2022     Priority: Medium    Moderate persistent asthma without complication 07/25/2022     Priority: Medium     ACT of 24. Stable. Follows with Allergy and Immunology who prescribes inhalers for patient.       MARGARITO (obstructive sleep apnea) 07/25/2022     Priority: Medium    Gout 07/25/2022     Priority: Medium     Follows with Nephrology. No gout flares since initiation of allopurinol which is prescribed by Nephrologist. Of note, is on hydrochlorothiazide for HTN (but as he is doing well without flares will continue for now).      Chronic kidney disease, stage 3a (H) 07/11/2022     Priority: Medium     Follows with Nephrology. Will obtain labs and send to Nephrology for review.       Essential (primary) hypertension 04/30/2013     Priority: Medium    Hyperlipidemia with target LDL less than 130 10/08/2012     Priority: Medium     Atorvastatin, simvastatin stopped in past 2/2 myalgias  Was on pravastatin for about 2 years, then stopped due to development of myalgias      Unilateral small kidney 12/12/2003     Priority: Medium     Atrophic rt kidney        Other genital herpes 12/12/2003     Priority:  Medium      Past Medical History:   Diagnosis Date    Hypertension     MARGARITO (obstructive sleep apnea) 07/25/2022    Other and unspecified hyperlipidemia     Other genital herpes     Uses oral Valtrex and topical acyclovir when gets flareup    Renal disease     Only one functioning kidney    Uncomplicated asthma     Unilateral small kidney     Atrophic rt kidney     Past Surgical History:   Procedure Laterality Date    BACK SURGERY  2002    Lumbar microdiskectomy    CHOLECYSTECTOMY  2023    COLONOSCOPY N/A 11/08/2017    repeat 10 years.Procedure: COLONOSCOPY;  COLONOSCOPY;  Surgeon: Oral Goodman MD;  Location:  GI    ELBOW SURGERY  2002    R Elbow surgery; incl resection of portion of radial head    EXCISE EXOSTOSIS TOE(S)  09/23/2013    Procedure: EXCISE EXOSTOSIS TOE(S);  Left Third Toe Bone Spur removal ;  Surgeon: Stephani Ramirez DPM, Podiatr;  Location: [facility] OR    LAPAROSCOPIC CHOLECYSTECTOMY N/A 04/07/2023    Procedure: LAPAROSCOPIC CHOLECYSTECTOMY;  Surgeon: Davion South MD;  Location:  OR    SURGICAL HISTORY OF -       LASIK right eye; did not work as has some kind of basement membrane disorder.    ZZ NONSPECIFIC PROCEDURE  1992    tonsillectomy    ZZC NONSPECIFIC PROCEDURE  2004    Another diskectomy     Current Outpatient Medications   Medication Sig Dispense Refill    allopurinol (ZYLOPRIM) 300 MG tablet Take 300 mg by mouth daily      BREO ELLIPTA 200-25 MCG/INH Inhaler Inhale 1 puff into the lungs daily      cetirizine (ZYRTEC) 10 MG tablet Take 10 mg by mouth daily      hydrochlorothiazide (HYDRODIURIL) 25 MG tablet TAKE 1 TABLET BY MOUTH EVERY DAY 90 tablet 0    losartan (COZAAR) 100 MG tablet TAKE 1 TABLET BY MOUTH EVERY DAY 90 tablet 0    ondansetron (ZOFRAN ODT) 4 MG ODT tab TAKE 1 TO 2 TABLETS DISSOLVED ON TONGUE AT ONSET OF NAUSEA DAILY AS NEEDED      VENTOLIN  (90 Base) MCG/ACT inhaler INHALE 2 PUFFS INTO THE LUNGS EVERY 4 HOURS AS NEEDED FOR SHORTNESS OF BREATH / DYSPNEA OR  "WHEEZING 18 Inhaler 3    acetaminophen (TYLENOL) 325 MG tablet Take 1-2 tablets (325-650 mg) by mouth every 6 hours as needed for mild pain (Patient not taking: Reported on 9/12/2023) 30 tablet 0    pravastatin (PRAVACHOL) 10 MG tablet Take 1 tablet (10 mg) by mouth daily. (Patient not taking: Reported on 1/3/2025) 90 tablet 3    pravastatin (PRAVACHOL) 20 MG tablet TAKE 1 TABLET BY MOUTH EVERY DAY (Patient not taking: Reported on 1/3/2025) 90 tablet 0       Allergies   Allergen Reactions    Atorvastatin      Other reaction(s): myalgia    No Known Drug Allergy     Seasonal Allergies         Social History     Tobacco Use    Smoking status: Never     Passive exposure: Never    Smokeless tobacco: Never   Substance Use Topics    Alcohol use: Yes     Alcohol/week: 3.3 - 5.0 standard drinks of alcohol     Types: 4 - 6 Standard drinks or equivalent per week     Comment: 7-8 beers per week     Family History   Problem Relation Age of Onset    C.A.D. Father         Had coronary artery bypass; late 60's.    Cancer Father         skin    Fractures Father         femur; just below hip    Alzheimer Disease Father     Eye Disorder Mother         cataracts; glaucoma, mac degeneration    Respiratory Mother         COPD    Lung Cancer Mother     Breast Cancer Mother     Colon Cancer No family hx of     Prostate Cancer No family hx of      History   Drug Use No             Review of Systems  Constitutional, HEENT, cardiovascular, pulmonary, gi and gu systems are negative, except as otherwise noted.    Objective    /85 (BP Location: Right arm, Patient Position: Sitting, Cuff Size: Adult Large)   Pulse 69   Temp 98.1  F (36.7  C) (Oral)   Resp 12   Ht 1.816 m (5' 11.5\")   Wt 123.4 kg (272 lb)   SpO2 98%   BMI 37.41 kg/m     Estimated body mass index is 37.41 kg/m  as calculated from the following:    Height as of this encounter: 1.816 m (5' 11.5\").    Weight as of this encounter: 123.4 kg (272 lb).  Physical " "Exam  GENERAL: alert and no distress  EYES: Eyes grossly normal to inspection  HENT: ear canals and TM's normal, nose and mouth without ulcers or lesions  NECK: no adenopathy, no asymmetry, masses, or scars  RESP: lungs clear to auscultation - no rales, rhonchi or wheezes  CV: regular rate and rhythm, normal S1 S2, no S3 or S4, no murmur, click or rub, no peripheral edema  ABDOMEN: soft, nontender, no hepatosplenomegaly, no masses and bowel sounds normal  NEURO: Normal strength and tone, mentation intact and speech normal  PSYCH: mentation appears normal, affect normal/bright    Recent Labs   Lab Test 11/26/24  0909   HGB 14.1         POTASSIUM 4.3   CR 1.49*   A1C 5.9*      Lab Results   Component Value Date    WBC 8.6 01/03/2025    WBC 8.3 09/04/2014     Lab Results   Component Value Date    RBC 4.61 01/03/2025    RBC 4.92 09/04/2014     Lab Results   Component Value Date    HGB 13.8 01/03/2025    HGB 14.4 09/04/2014     Lab Results   Component Value Date    HCT 41.0 01/03/2025    HCT 43.1 09/04/2014     No components found for: \"MCT\"  Lab Results   Component Value Date    MCV 89 01/03/2025    MCV 88 09/04/2014     Lab Results   Component Value Date    MCH 29.9 01/03/2025    MCH 29.3 09/04/2014     Lab Results   Component Value Date    MCHC 33.7 01/03/2025    MCHC 33.4 09/04/2014     Lab Results   Component Value Date    RDW 13.6 01/03/2025    RDW 12.8 09/04/2014     Lab Results   Component Value Date     01/03/2025     09/04/2014      Last Comprehensive Metabolic Panel:  Sodium   Date Value Ref Range Status   01/03/2025 138 135 - 145 mmol/L Final   04/25/2019 135 133 - 144 mmol/L Final     Potassium   Date Value Ref Range Status   01/03/2025 4.5 3.4 - 5.3 mmol/L Final   07/21/2022 4.0 3.4 - 5.3 mmol/L Final   04/25/2019 4.3 3.4 - 5.3 mmol/L Final     Chloride   Date Value Ref Range Status   01/03/2025 99 98 - 107 mmol/L Final   07/21/2022 105 94 - 109 mmol/L Final   04/25/2019 101 94 - " 109 mmol/L Final     Carbon Dioxide   Date Value Ref Range Status   04/25/2019 28 20 - 32 mmol/L Final     Carbon Dioxide (CO2)   Date Value Ref Range Status   01/03/2025 27 22 - 29 mmol/L Final   07/21/2022 23 20 - 32 mmol/L Final     Anion Gap   Date Value Ref Range Status   01/03/2025 12 7 - 15 mmol/L Final   07/21/2022 10 3 - 14 mmol/L Final   04/25/2019 6 3 - 14 mmol/L Final     Glucose   Date Value Ref Range Status   01/03/2025 107 (H) 70 - 99 mg/dL Final   07/21/2022 105 (H) 70 - 99 mg/dL Final   04/25/2019 111 (H) 70 - 99 mg/dL Final     Comment:     Fasting specimen     Urea Nitrogen   Date Value Ref Range Status   01/03/2025 19.9 6.0 - 20.0 mg/dL Final   07/21/2022 21 7 - 30 mg/dL Final   04/25/2019 24 7 - 30 mg/dL Final     Creatinine   Date Value Ref Range Status   01/03/2025 1.49 (H) 0.67 - 1.17 mg/dL Final   04/25/2019 1.48 (H) 0.66 - 1.25 mg/dL Final     GFR Estimate   Date Value Ref Range Status   01/03/2025 54 (L) >60 mL/min/1.73m2 Final     Comment:     eGFR calculated using 2021 CKD-EPI equation.   04/25/2019 53 (L) >60 mL/min/[1.73_m2] Final     Comment:     Non  GFR Calc  Starting 12/18/2018, serum creatinine based estimated GFR (eGFR) will be   calculated using the Chronic Kidney Disease Epidemiology Collaboration   (CKD-EPI) equation.       Calcium   Date Value Ref Range Status   01/03/2025 10.0 8.8 - 10.4 mg/dL Final     Comment:     Reference intervals for this test were updated on 7/16/2024 to reflect our healthy population more accurately. There may be differences in the flagging of prior results with similar values performed with this method. Those prior results can be interpreted in the context of the updated reference intervals.   04/25/2019 9.6 8.5 - 10.1 mg/dL Final     Bilirubin Total   Date Value Ref Range Status   04/07/2023 0.6 <=1.2 mg/dL Final   04/25/2019 0.7 0.2 - 1.3 mg/dL Final     Alkaline Phosphatase   Date Value Ref Range Status   04/07/2023 94 40 - 129  U/L Final   04/25/2019 87 40 - 150 U/L Final     ALT   Date Value Ref Range Status   11/26/2024 23 0 - 70 U/L Final   04/25/2019 24 0 - 70 U/L Final     AST   Date Value Ref Range Status   11/26/2024 24 0 - 45 U/L Final   04/25/2019 27 0 - 45 U/L Final         Diagnostics  Labs pending at this time.  Results will be reviewed when available.   EKG sinus rhythm.    Revised Cardiac Risk Index (RCRI)  The patient has the following serious cardiovascular risks for perioperative complications:   - No serious cardiac risks = 0 points     RCRI Interpretation: 0 points: Class I (very low risk - 0.4% complication rate)       The longitudinal plan of care for the diagnosis(es)/condition(s) as documented were addressed during this visit. Due to the added complexity in care, I will continue to support Nash in the subsequent management and with ongoing continuity of care.      Signed Electronically by: DAISHA Sharma CNP  A copy of this evaluation report is provided to the requesting physician.

## 2025-02-24 ENCOUNTER — TRANSFERRED RECORDS (OUTPATIENT)
Dept: HEALTH INFORMATION MANAGEMENT | Facility: CLINIC | Age: 60
End: 2025-02-24
Payer: COMMERCIAL

## 2025-02-28 ENCOUNTER — TRANSFERRED RECORDS (OUTPATIENT)
Dept: HEALTH INFORMATION MANAGEMENT | Facility: CLINIC | Age: 60
End: 2025-02-28
Payer: COMMERCIAL

## 2025-04-14 ENCOUNTER — TRANSFERRED RECORDS (OUTPATIENT)
Dept: HEALTH INFORMATION MANAGEMENT | Facility: CLINIC | Age: 60
End: 2025-04-14
Payer: COMMERCIAL

## 2025-04-15 ENCOUNTER — TRANSFERRED RECORDS (OUTPATIENT)
Dept: HEALTH INFORMATION MANAGEMENT | Facility: CLINIC | Age: 60
End: 2025-04-15
Payer: COMMERCIAL

## 2025-07-14 ENCOUNTER — TRANSFERRED RECORDS (OUTPATIENT)
Dept: HEALTH INFORMATION MANAGEMENT | Facility: CLINIC | Age: 60
End: 2025-07-14
Payer: COMMERCIAL

## 2025-08-25 ENCOUNTER — HOSPITAL ENCOUNTER (OUTPATIENT)
Dept: ULTRASOUND IMAGING | Facility: CLINIC | Age: 60
Discharge: HOME | End: 2025-08-25
Attending: INTERNAL MEDICINE | Admitting: INTERNAL MEDICINE
Payer: COMMERCIAL

## 2025-08-25 DIAGNOSIS — R10.9 LEFT FLANK PAIN: ICD-10-CM

## 2025-08-25 DIAGNOSIS — N18.31 CHRONIC KIDNEY DISEASE, STAGE 3A (H): ICD-10-CM

## 2025-08-25 DIAGNOSIS — N26.1 ATROPHY OF RIGHT KIDNEY: ICD-10-CM

## 2025-08-25 PROCEDURE — 76770 US EXAM ABDO BACK WALL COMP: CPT

## (undated) DEVICE — DECANTER VIAL 2006S

## (undated) DEVICE — ESU GROUND PAD UNIVERSAL W/O CORD

## (undated) DEVICE — SUCTION IRR STRYKERFLOW II W/TIP 250-070-520

## (undated) DEVICE — PACK LAP CHOLE SLC15LCFSD

## (undated) DEVICE — ENDO POUCH UNIV RETRIEVAL SYSTEM INZII 10MM CD001

## (undated) DEVICE — APPLICATOR VISTASEAL RIGID TIP 35CM VSTL35

## (undated) DEVICE — SOL NACL 0.9% INJ 1000ML BAG 2B1324X

## (undated) DEVICE — GLOVE BIOGEL PI MICRO SZ 7.5 48575

## (undated) DEVICE — SOL WATER IRRIG 1000ML BOTTLE 2F7114

## (undated) DEVICE — ENDO TROCAR SLEEVE KII Z-THREADED 05X100MM CTS02

## (undated) DEVICE — ENDO TROCAR FIRST ENTRY KII FIOS Z-THRD 05X100MM CTF03

## (undated) DEVICE — ENDO SCOPE WARMER LF TM500

## (undated) DEVICE — ENDO TROCAR FIRST ENTRY KII FIOS Z-THRD 12X100MM CTF73

## (undated) DEVICE — SU VICRYL 3-0 SH 27" J316H

## (undated) DEVICE — LINEN TOWEL PACK X5 5464

## (undated) DEVICE — EVAC SYSTEM CLEAR FLOW SC082500

## (undated) DEVICE — PREP CHLORAPREP 26ML TINTED HI-LITE ORANGE 930815

## (undated) DEVICE — STPL RELOAD REG TISSUE ECHELON 45 X 3.6MM BLUE GST45B

## (undated) DEVICE — GLOVE BIOGEL PI MICRO INDICATOR UNDERGLOVE SZ 7.5 48975

## (undated) DEVICE — ENDO TROCAR BLUNT TIP KII BALLOON 12X100MM C0R47

## (undated) DEVICE — SU VICRYL 0 UR-6 27" J603H

## (undated) DEVICE — SU MONOCRYL 4-0 PS-2 18" UND Y496G

## (undated) DEVICE — KIT ENDO TURNOVER/PROCEDURE W/CLEAN A SCOPE LINERS 103888

## (undated) DEVICE — SU VICRYL 3-0 SH 27" UND J416H

## (undated) DEVICE — RX VISTASEAL FIBRIN SEALANT W/THROMBIN 4ML VST04

## (undated) DEVICE — CLIP APPLIER ENDO 5MM M/L LIGAMAX EL5ML

## (undated) DEVICE — MANIFOLD NEPTUNE 4 PORT 700-20

## (undated) DEVICE — ESU HOLDER LAP INST DISP PURPLE LONG 330MM H-PRO-330

## (undated) DEVICE — STPL ENDO ARTICULATING 45MM EC45A

## (undated) RX ORDER — PROPOFOL 10 MG/ML
INJECTION, EMULSION INTRAVENOUS
Status: DISPENSED
Start: 2023-04-07

## (undated) RX ORDER — FENTANYL CITRATE 50 UG/ML
INJECTION, SOLUTION INTRAMUSCULAR; INTRAVENOUS
Status: DISPENSED
Start: 2017-11-08

## (undated) RX ORDER — GLYCOPYRROLATE 0.2 MG/ML
INJECTION, SOLUTION INTRAMUSCULAR; INTRAVENOUS
Status: DISPENSED
Start: 2023-04-07

## (undated) RX ORDER — DEXAMETHASONE SODIUM PHOSPHATE 4 MG/ML
INJECTION, SOLUTION INTRA-ARTICULAR; INTRALESIONAL; INTRAMUSCULAR; INTRAVENOUS; SOFT TISSUE
Status: DISPENSED
Start: 2023-04-07

## (undated) RX ORDER — NEOSTIGMINE METHYLSULFATE 1 MG/ML
VIAL (ML) INJECTION
Status: DISPENSED
Start: 2023-04-07

## (undated) RX ORDER — FENTANYL CITRATE 0.05 MG/ML
INJECTION, SOLUTION INTRAMUSCULAR; INTRAVENOUS
Status: DISPENSED
Start: 2023-04-07

## (undated) RX ORDER — BUPIVACAINE HYDROCHLORIDE AND EPINEPHRINE 2.5; 5 MG/ML; UG/ML
INJECTION, SOLUTION EPIDURAL; INFILTRATION; INTRACAUDAL; PERINEURAL
Status: DISPENSED
Start: 2023-04-07

## (undated) RX ORDER — MORPHINE SULFATE 2 MG/ML
INJECTION, SOLUTION INTRAMUSCULAR; INTRAVENOUS
Status: DISPENSED
Start: 2023-04-07

## (undated) RX ORDER — HYDROMORPHONE HYDROCHLORIDE 1 MG/ML
INJECTION, SOLUTION INTRAMUSCULAR; INTRAVENOUS; SUBCUTANEOUS
Status: DISPENSED
Start: 2023-04-07

## (undated) RX ORDER — FENTANYL CITRATE 50 UG/ML
INJECTION, SOLUTION INTRAMUSCULAR; INTRAVENOUS
Status: DISPENSED
Start: 2023-04-07

## (undated) RX ORDER — ONDANSETRON 2 MG/ML
INJECTION INTRAMUSCULAR; INTRAVENOUS
Status: DISPENSED
Start: 2023-04-07